# Patient Record
Sex: FEMALE | Race: WHITE | Employment: FULL TIME | URBAN - METROPOLITAN AREA
[De-identification: names, ages, dates, MRNs, and addresses within clinical notes are randomized per-mention and may not be internally consistent; named-entity substitution may affect disease eponyms.]

---

## 2017-01-09 ENCOUNTER — OFFICE VISIT (OUTPATIENT)
Dept: OBGYN CLINIC | Facility: CLINIC | Age: 37
End: 2017-01-09

## 2017-01-09 VITALS
DIASTOLIC BLOOD PRESSURE: 80 MMHG | RESPIRATION RATE: 16 BRPM | HEIGHT: 67 IN | HEART RATE: 76 BPM | BODY MASS INDEX: 26.37 KG/M2 | SYSTOLIC BLOOD PRESSURE: 120 MMHG | WEIGHT: 168 LBS

## 2017-01-09 DIAGNOSIS — Z12.31 SCREENING MAMMOGRAM, ENCOUNTER FOR: ICD-10-CM

## 2017-01-09 DIAGNOSIS — Z31.41 FERTILITY TESTING: ICD-10-CM

## 2017-01-09 DIAGNOSIS — Z12.4 PAPANICOLAOU SMEAR: ICD-10-CM

## 2017-01-09 DIAGNOSIS — Z01.419 WELL WOMAN EXAM WITH ROUTINE GYNECOLOGICAL EXAM: Primary | ICD-10-CM

## 2017-01-09 PROCEDURE — 87624 HPV HI-RISK TYP POOLED RSLT: CPT | Performed by: OBSTETRICS & GYNECOLOGY

## 2017-01-09 PROCEDURE — 99395 PREV VISIT EST AGE 18-39: CPT | Performed by: OBSTETRICS & GYNECOLOGY

## 2017-01-09 PROCEDURE — 87591 N.GONORRHOEAE DNA AMP PROB: CPT | Performed by: OBSTETRICS & GYNECOLOGY

## 2017-01-09 PROCEDURE — 88175 CYTOPATH C/V AUTO FLUID REDO: CPT | Performed by: OBSTETRICS & GYNECOLOGY

## 2017-01-09 PROCEDURE — 87491 CHLMYD TRACH DNA AMP PROBE: CPT | Performed by: OBSTETRICS & GYNECOLOGY

## 2017-01-09 NOTE — PROGRESS NOTES
Patient ID:   Param Miller  is a 31year old female         HPI:     Here for general exam. Last seen 2015. New medical/surgical hx:  None. Patient's last menstrual period was 2016. Menses:    Cash [place] issues to discuss:  CLAY 12/19/2016  Neck:  Supple. Thyroid:  normal.  No cervical adenopathy. Cardiovascular: Normal rate, regular rhythm, normal heart sounds. Pulmonary/Chest: Clear to auscultation.  Breath sounds normal.  Breasts:   Symmetrical.  Firm tissue with homogeneous t

## 2017-01-11 LAB
C TRACH DNA SPEC QL NAA+PROBE: NEGATIVE
N GONORRHOEA DNA SPEC QL NAA+PROBE: NEGATIVE

## 2017-01-12 DIAGNOSIS — N97.9 FEMALE INFERTILITY: Primary | ICD-10-CM

## 2017-01-12 NOTE — PROGRESS NOTES
Quick Note:    STD screening done for RE referral negative.  Please check on status of referral.  ______

## 2017-01-15 LAB — HPV I/H RISK 1 DNA SPEC QL NAA+PROBE: NEGATIVE

## 2017-01-17 NOTE — PROGRESS NOTES
Quick Note:    Left detailed message on cell voicemail. (Pt has FYI)  Pap normal/hpv neg. Pt to call if any questions or concerns.   ______

## 2017-01-25 ENCOUNTER — TELEPHONE (OUTPATIENT)
Dept: FAMILY MEDICINE CLINIC | Facility: CLINIC | Age: 37
End: 2017-01-25

## 2017-01-25 ENCOUNTER — OFFICE VISIT (OUTPATIENT)
Dept: FAMILY MEDICINE CLINIC | Facility: CLINIC | Age: 37
End: 2017-01-25

## 2017-01-25 VITALS
HEART RATE: 70 BPM | BODY MASS INDEX: 25.58 KG/M2 | SYSTOLIC BLOOD PRESSURE: 104 MMHG | TEMPERATURE: 98 F | WEIGHT: 163 LBS | DIASTOLIC BLOOD PRESSURE: 76 MMHG | HEIGHT: 67 IN | RESPIRATION RATE: 16 BRPM

## 2017-01-25 DIAGNOSIS — R63.5 WEIGHT GAIN: ICD-10-CM

## 2017-01-25 DIAGNOSIS — K59.09 CHRONIC CONSTIPATION: Primary | ICD-10-CM

## 2017-01-25 PROCEDURE — 99203 OFFICE O/P NEW LOW 30 MIN: CPT | Performed by: FAMILY MEDICINE

## 2017-01-25 RX ORDER — PHENTERMINE HYDROCHLORIDE 15 MG/1
15 CAPSULE ORAL EVERY MORNING
Qty: 30 CAPSULE | Refills: 0 | Status: SHIPPED | OUTPATIENT
Start: 2017-01-25 | End: 2017-07-27

## 2017-01-25 NOTE — TELEPHONE ENCOUNTER
Let pt know that I want to try linzess 145mcg daily for her chronic constipation. Almost no side effects as it only works in the gut and does not get absorbed. I already sent the script.

## 2017-01-25 NOTE — PROGRESS NOTES
HPI:    Patient ID: Zully Dior is a 31year old female. Constipation  This is a chronic problem. Episode onset: 1 yr  The problem is unchanged. Her stool frequency is 2 to 3 times per week. The stool is described as formed and firm.  The patient is on diagnosis)  Weight gain  Referred to [facility] to get colonoscopy. 1. Chronic constipation  - Gastro Referral - In Network    2. Weight gain  - DIETITIAN EDUCATION INITIAL, DIET (INTERNAL)  - Phentermine HCl 15 MG Oral Cap;  Take 1 capsule (15 mg total) by

## 2017-01-30 ENCOUNTER — TELEPHONE (OUTPATIENT)
Dept: OBGYN CLINIC | Facility: CLINIC | Age: 37
End: 2017-01-30

## 2017-01-30 NOTE — TELEPHONE ENCOUNTER
Pt called stating that Dr. Juan F Noonan office does not have the referral that Dr. Jay Moura gave her on 1/9  Pt would like the following info faxed to Dr. Mariya Boucher office  Referral  Pap smear results  GC/Chlamydia results    Fax:  [phone]

## 2017-02-21 ENCOUNTER — OFFICE VISIT (OUTPATIENT)
Dept: FAMILY MEDICINE CLINIC | Facility: CLINIC | Age: 37
End: 2017-02-21

## 2017-02-21 VITALS
WEIGHT: 162 LBS | OXYGEN SATURATION: 98 % | DIASTOLIC BLOOD PRESSURE: 78 MMHG | TEMPERATURE: 98 F | SYSTOLIC BLOOD PRESSURE: 116 MMHG | HEIGHT: 67 IN | HEART RATE: 61 BPM | BODY MASS INDEX: 25.43 KG/M2 | RESPIRATION RATE: 20 BRPM

## 2017-02-21 DIAGNOSIS — K59.00 CONSTIPATION, UNSPECIFIED CONSTIPATION TYPE: Primary | ICD-10-CM

## 2017-02-21 DIAGNOSIS — E66.3 OVERWEIGHT: ICD-10-CM

## 2017-02-21 PROCEDURE — 99213 OFFICE O/P EST LOW 20 MIN: CPT | Performed by: FAMILY MEDICINE

## 2017-02-21 RX ORDER — PHENTERMINE HYDROCHLORIDE 37.5 MG/1
37.5 TABLET ORAL
Qty: 30 TABLET | Refills: 1 | Status: SHIPPED | OUTPATIENT
Start: 2017-02-21 | End: 2017-05-21

## 2017-02-22 NOTE — PROGRESS NOTES
HPI:    Patient ID: Toby Dawson is a 31year old female. Constipation  This is a chronic problem. Episode onset: few yrs ago. The problem has been gradually worsening since onset. The stool is described as firm. The patient is on a high fiber diet.  She She is not diaphoretic. Vitals reviewed. ASSESSMENT/PLAN:   Constipation, unspecified constipation type  (primary encounter diagnosis)  Overweight  1. Constipation, unspecified constipation type  Continue with GI follow up.   Try higher dose o

## 2017-03-22 ENCOUNTER — HOSPITAL ENCOUNTER (OUTPATIENT)
Dept: MAMMOGRAPHY | Age: 37
Discharge: HOME | End: 2017-03-22
Attending: OBSTETRICS & GYNECOLOGY
Payer: COMMERCIAL

## 2017-03-22 DIAGNOSIS — Z12.31 SCREENING MAMMOGRAM, ENCOUNTER FOR: ICD-10-CM

## 2017-03-22 PROCEDURE — 77067 SCR MAMMO BI INCL CAD: CPT

## 2017-03-29 ENCOUNTER — HOSPITAL ENCOUNTER (OUTPATIENT)
Dept: GENERAL RADIOLOGY | Facility: HOSPITAL | Age: 37
Discharge: HOME | End: 2017-03-29
Attending: INTERNAL MEDICINE
Payer: COMMERCIAL

## 2017-03-29 DIAGNOSIS — K59.02 OUTLET DYSFUNCTION CONSTIPATION: ICD-10-CM

## 2017-03-29 DIAGNOSIS — K59.00 CONSTIPATION, UNSPECIFIED CONSTIPATION TYPE: ICD-10-CM

## 2017-03-29 PROCEDURE — 74000: CPT

## 2017-03-30 ENCOUNTER — TELEPHONE (OUTPATIENT)
Dept: OBGYN CLINIC | Facility: CLINIC | Age: 37
End: 2017-03-30

## 2017-03-30 NOTE — TELEPHONE ENCOUNTER
Patient states she had tubal ligation in 2008. She was referred to Dr. Win Luevano. She states her insurance will not cover any testing b/c of tubal.  She is wondering if there is anyway the tubal can be reversed.    She states her insurance will cover it if s

## 2017-03-30 NOTE — TELEPHONE ENCOUNTER
Tubal reversal can be attempted, but success depends on how much tube remains. Would need to get copy of operative report and then see reproductive specialist who does tubal reversals.

## 2017-03-31 ENCOUNTER — HOSPITAL ENCOUNTER (OUTPATIENT)
Dept: GENERAL RADIOLOGY | Facility: HOSPITAL | Age: 37
Discharge: HOME | End: 2017-03-31
Attending: INTERNAL MEDICINE
Payer: COMMERCIAL

## 2017-03-31 DIAGNOSIS — K59.02 OUTLET DYSFUNCTION CONSTIPATION: ICD-10-CM

## 2017-03-31 DIAGNOSIS — K59.00 CONSTIPATION: ICD-10-CM

## 2017-03-31 PROCEDURE — 74000: CPT

## 2017-03-31 NOTE — TELEPHONE ENCOUNTER
OR report was obtained from Medical Records. Do you recommend a specialist who does tubal reversals? Please advise.

## 2017-04-01 NOTE — TELEPHONE ENCOUNTER
Please send operative report to pt. Do not know who in area does tubal reversals. Have her start by calling Dr Annette Morales and Dr Andre Alvarez. May need to get referral list of reproductive endocrinologists from her insurance company.

## 2017-04-03 NOTE — TELEPHONE ENCOUNTER
Patient informed. Verbalized understanding. Copy mailed to patient's home of Op report. No further questions or concerns @ this time.

## 2017-04-18 ENCOUNTER — MED REC SCAN ONLY (OUTPATIENT)
Dept: FAMILY MEDICINE CLINIC | Facility: CLINIC | Age: 37
End: 2017-04-18

## 2017-04-25 ENCOUNTER — TELEPHONE (OUTPATIENT)
Dept: OBGYN CLINIC | Facility: CLINIC | Age: 37
End: 2017-04-25

## 2017-04-25 NOTE — TELEPHONE ENCOUNTER
Patient called, she had her tubal done and she wants it reserve. Dr. Jennifer Luna told her that he does not know anyone who does it. So, she did it herself and look for someone.   She found one , Dr. Claudell Louis, who can do it but he wants a copy of the patho

## 2017-04-26 NOTE — TELEPHONE ENCOUNTER
Left message to call back. Op report received, medical records unable to locate path report. Informed patient to call back after 9 AM tomorrow.

## 2017-04-26 NOTE — TELEPHONE ENCOUNTER
Spoke to medical records. Will obtain records and fax to Katelyn Coker [id number].

## 2017-04-26 NOTE — TELEPHONE ENCOUNTER
Patient returned phone call  She knows Constantino Del Toro will call her back with more information

## 2017-05-09 ENCOUNTER — TELEPHONE (OUTPATIENT)
Dept: FAMILY MEDICINE CLINIC | Facility: CLINIC | Age: 37
End: 2017-05-09

## 2017-05-22 RX ORDER — PHENTERMINE HYDROCHLORIDE 37.5 MG/1
TABLET ORAL
Qty: 30 TABLET | Refills: 0 | Status: SHIPPED | OUTPATIENT
Start: 2017-05-22 | End: 2017-06-30

## 2017-06-30 ENCOUNTER — TELEPHONE (OUTPATIENT)
Dept: FAMILY MEDICINE CLINIC | Facility: CLINIC | Age: 37
End: 2017-06-30

## 2017-07-05 RX ORDER — PHENTERMINE HYDROCHLORIDE 37.5 MG/1
TABLET ORAL
Qty: 30 TABLET | Refills: 0 | Status: SHIPPED | OUTPATIENT
Start: 2017-07-05 | End: 2017-08-07

## 2017-07-05 NOTE — TELEPHONE ENCOUNTER
Pharmacy called stated they did not get the refill/approval for the phentermine medication. Please call and advise.

## 2017-07-20 ENCOUNTER — OFFICE VISIT (OUTPATIENT)
Dept: OBGYN CLINIC | Facility: CLINIC | Age: 37
End: 2017-07-20

## 2017-07-20 DIAGNOSIS — N92.6 IRREGULAR MENSES: Primary | ICD-10-CM

## 2017-07-20 DIAGNOSIS — N97.1 SECONDARY TUBAL INFERTILITY: ICD-10-CM

## 2017-07-20 PROCEDURE — 99212 OFFICE O/P EST SF 10 MIN: CPT | Performed by: OBSTETRICS & GYNECOLOGY

## 2017-07-20 RX ORDER — LEVONORGESTREL AND ETHINYL ESTRADIOL 0.1-0.02MG
1 KIT ORAL DAILY
Qty: 3 PACKAGE | Refills: 1 | Status: SHIPPED | OUTPATIENT
Start: 2017-07-20 | End: 2017-09-19

## 2017-07-20 NOTE — PROGRESS NOTES
Patient is a 30year old here for consultation on several matters. Last seen in January 2017. Wants another pregnancy and has had tubal ligation. Met with Dr Debby Navas for consultation on IVF/ET.  Insurance will not cover since she had an elective tubal ligati

## 2017-07-27 ENCOUNTER — APPOINTMENT (OUTPATIENT)
Dept: GENERAL RADIOLOGY | Age: 37
End: 2017-07-27
Attending: PHYSICIAN ASSISTANT
Payer: COMMERCIAL

## 2017-07-27 ENCOUNTER — HOSPITAL ENCOUNTER (OUTPATIENT)
Age: 37
Discharge: HOME | End: 2017-07-27
Payer: COMMERCIAL

## 2017-07-27 VITALS
HEART RATE: 88 BPM | OXYGEN SATURATION: 99 % | RESPIRATION RATE: 18 BRPM | SYSTOLIC BLOOD PRESSURE: 132 MMHG | TEMPERATURE: 98 F | DIASTOLIC BLOOD PRESSURE: 92 MMHG

## 2017-07-27 DIAGNOSIS — M70.41 PREPATELLAR BURSITIS OF RIGHT KNEE: Primary | ICD-10-CM

## 2017-07-27 PROCEDURE — 99213 OFFICE O/P EST LOW 20 MIN: CPT

## 2017-07-27 PROCEDURE — 99214 OFFICE O/P EST MOD 30 MIN: CPT

## 2017-07-27 PROCEDURE — 73560 X-RAY EXAM OF KNEE 1 OR 2: CPT | Performed by: PHYSICIAN ASSISTANT

## 2017-07-27 RX ORDER — METHYLPREDNISOLONE 4 MG/1
TABLET ORAL
Qty: 1 PACKAGE | Refills: 0 | Status: SHIPPED | OUTPATIENT
Start: 2017-07-27 | End: 2017-09-19

## 2017-07-27 NOTE — ED PROVIDER NOTES
Patient Seen in: [facility]    History   Patient presents with:  Knee Pain    Stated Complaint: r knee swelling 1 day    HPI    31yo F who comes in with right knee swelling and pain that began today.  She states that this began t Packs/day: 0.00      Years: 10.00        Types: Cigarettes     Quit date: 3/1/2016  Smokeless tobacco: Never Used                      Comment: 1 PK./WK. SINCE ABT 1999.   Alcohol use: Yes           0.6 oz/week     Standard drinks or equivalent: 1 per week exhibits normal muscle tone. Coordination normal.   Skin: Skin is warm and dry. No rash noted. She is not diaphoretic. No erythema. No pallor. Psychiatric: She has a normal mood and affect.  Her behavior is normal. Judgment and thought content normal. Pack  Take as directed on package  Qty: 1 Package Refills: 0  Associated Diagnoses:Prepatellar bursitis of right knee      I have given the patient instructions regarding her diagnosis, expectations, follow up, and return to the ER precautions.   I explaine

## 2017-08-07 RX ORDER — PHENTERMINE HYDROCHLORIDE 37.5 MG/1
TABLET ORAL
Qty: 30 TABLET | Refills: 0 | Status: SHIPPED | OUTPATIENT
Start: 2017-08-07 | End: 2017-09-18

## 2017-08-12 ENCOUNTER — HOSPITAL ENCOUNTER (EMERGENCY)
Facility: HOSPITAL | Age: 37
Discharge: HOME | End: 2017-08-12
Attending: EMERGENCY MEDICINE
Payer: COMMERCIAL

## 2017-08-12 VITALS
SYSTOLIC BLOOD PRESSURE: 132 MMHG | TEMPERATURE: 98 F | OXYGEN SATURATION: 100 % | HEART RATE: 88 BPM | BODY MASS INDEX: 22.76 KG/M2 | HEIGHT: 67 IN | WEIGHT: 145 LBS | DIASTOLIC BLOOD PRESSURE: 89 MMHG | RESPIRATION RATE: 19 BRPM

## 2017-08-12 DIAGNOSIS — K64.5 EXTERNAL THROMBOSED HEMORRHOIDS: Primary | ICD-10-CM

## 2017-08-12 PROCEDURE — 99283 EMERGENCY DEPT VISIT LOW MDM: CPT

## 2017-08-12 PROCEDURE — 46083 INC THROMBOSED HROID XTRNL: CPT

## 2017-08-12 RX ORDER — DIBUCAINE 0.28 G/28G
1 OINTMENT TOPICAL 3 TIMES DAILY
Qty: 1 TUBE | Refills: 0 | Status: SHIPPED | OUTPATIENT
Start: 2017-08-12 | End: 2017-09-19

## 2017-08-12 RX ORDER — DOCUSATE SODIUM 100 MG/1
100 CAPSULE, LIQUID FILLED ORAL 2 TIMES DAILY
Qty: 60 CAPSULE | Refills: 0 | Status: SHIPPED | OUTPATIENT
Start: 2017-08-12 | End: 2017-09-11

## 2017-08-12 RX ORDER — TRAMADOL HYDROCHLORIDE 50 MG/1
TABLET ORAL EVERY 4 HOURS PRN
Qty: 20 TABLET | Refills: 0 | Status: SHIPPED | OUTPATIENT
Start: 2017-08-12 | End: 2017-08-19

## 2017-08-12 NOTE — ED NOTES
Small serosanguinous drainage on gauze, no excessive bleeding at DC. Gauze dressing changed, given maxi pad to cushion the area.

## 2017-08-12 NOTE — ED PROVIDER NOTES
Patient Seen in: [facility] Emergency Department    History   Patient presents with:  Anal Problem (GI)    Stated Complaint: hemmroid    HPI    This is a pleasant 61-year-old female coming with complaints of the hemorrhoid.   She is actually had this Maternal Grandmother    • Hypertension Mother    • Other[other] [OTHER] Maternal Grandfather    • Stroke Maternal Grandfather    • Heart Disease Maternal Grandfather    • Stroke Paternal Grandfather    • Heart Disease Paternal Grandfather    • Other[other] 1% lidocaine solution was instilled within the thrombosed hemorrhoid itself and using 11 blade stab incision was made. Some blood and blood clot material was removed.   Patient has dressing applied patient was referred to general surgery for follow-up and

## 2017-08-12 NOTE — ED INITIAL ASSESSMENT (HPI)
C/o rectal pain, \"I have hemorrhoids, I've been dealing with this for 2 days and it's getting worse. I've been having problems with my bowels, I don't go if I don't take anything for it. \" Last BM this am PTA.

## 2017-08-17 ENCOUNTER — TELEPHONE (OUTPATIENT)
Dept: FAMILY MEDICINE CLINIC | Facility: CLINIC | Age: 37
End: 2017-08-17

## 2017-08-17 DIAGNOSIS — K64.5 THROMBOSED HEMORRHOIDS: Primary | ICD-10-CM

## 2017-09-12 RX ORDER — PHENTERMINE HYDROCHLORIDE 37.5 MG/1
TABLET ORAL
Qty: 30 TABLET | Refills: 0 | OUTPATIENT
Start: 2017-09-12

## 2017-09-18 RX ORDER — PHENTERMINE HYDROCHLORIDE 37.5 MG/1
TABLET ORAL
Qty: 30 TABLET | Refills: 0 | Status: SHIPPED | OUTPATIENT
Start: 2017-09-18 | End: 2018-01-10

## 2017-09-19 ENCOUNTER — OFFICE VISIT (OUTPATIENT)
Dept: FAMILY MEDICINE CLINIC | Facility: CLINIC | Age: 37
End: 2017-09-19

## 2017-09-19 VITALS
RESPIRATION RATE: 16 BRPM | WEIGHT: 146 LBS | SYSTOLIC BLOOD PRESSURE: 132 MMHG | BODY MASS INDEX: 22.91 KG/M2 | HEART RATE: 89 BPM | DIASTOLIC BLOOD PRESSURE: 74 MMHG | HEIGHT: 67 IN | OXYGEN SATURATION: 98 % | TEMPERATURE: 98 F

## 2017-09-19 DIAGNOSIS — K59.09 CHRONIC CONSTIPATION: ICD-10-CM

## 2017-09-19 DIAGNOSIS — G89.29 CHRONIC PAIN OF RIGHT KNEE: ICD-10-CM

## 2017-09-19 DIAGNOSIS — K64.4 EXTERNAL HEMORRHOID: ICD-10-CM

## 2017-09-19 DIAGNOSIS — Z79.899 MEDICATION MANAGEMENT: Primary | ICD-10-CM

## 2017-09-19 DIAGNOSIS — M25.561 CHRONIC PAIN OF RIGHT KNEE: ICD-10-CM

## 2017-09-19 PROCEDURE — 99214 OFFICE O/P EST MOD 30 MIN: CPT | Performed by: FAMILY MEDICINE

## 2017-09-19 RX ORDER — POLYETHYLENE GLYCOL 3350 17 G/17G
17 POWDER, FOR SOLUTION ORAL DAILY
Qty: 100 EACH | Refills: 1 | Status: SHIPPED | OUTPATIENT
Start: 2017-09-19 | End: 2018-01-10

## 2017-09-19 RX ORDER — PHENTERMINE HYDROCHLORIDE 15 MG/1
15 CAPSULE ORAL EVERY MORNING
Qty: 14 CAPSULE | Refills: 0 | Status: SHIPPED | OUTPATIENT
Start: 2017-10-09 | End: 2018-01-10

## 2017-09-21 NOTE — PROGRESS NOTES
HPI:    Patient ID: Swathi Huang is a 30year old female. Chronic constipation. States that the senna tea she is using is helping. But not completely. Had tried miralax for a couple of months a few years ago without relief.   Has not gone back to see Comment:sneezing  Hydrocodone-Acetami*    Rash   PHYSICAL EXAM:   Physical Exam   Constitutional: She appears well-developed and well-nourished. No distress. Eyes: Conjunctivae are normal. Right eye exhibits no discharge. Left eye exhibits no discharge. this encounter. Meds This Visit:  Signed Prescriptions Disp Refills    Phentermine HCl 15 MG Oral Cap 14 capsule 0      Sig: Take 1 capsule (15 mg total) by mouth every morning.       PEG 3350 Oral Powd Pack 100 each 1      Sig: Take 17 g by mouth joseline

## 2017-09-22 NOTE — MR AVS SNAPSHOT
After Visit Summary   12/20/2019    Farida Nguyen    MRN: [id number]           Visit Information     Date & Time  12/20/2019 10:00 AM Provider  Leann Prater MD [facility] [address] [address] [facility] Dept.  Phone  [phone] Water is best for hydration Fast food. Eat at home when possible     Tips for increasing your physical activity – Adults who are physically active are less likely to develop some chronic diseases than adults who are inactive.      HOW TO GET STARTED: HOW Communicate with a [facility] physician or [facility]  online. The physician will respond and provide a treatment plan within a few hours.            Treatment for mild   illness or injury that does   not require immediate attention   VIDEO VISITS  Average cost  $35* Bronchiectasis

## 2017-11-02 ENCOUNTER — TELEPHONE (OUTPATIENT)
Dept: OBGYN CLINIC | Facility: CLINIC | Age: 37
End: 2017-11-02

## 2017-11-02 DIAGNOSIS — Z20.821 ZIKA VIRUS EXPOSURE: Primary | ICD-10-CM

## 2017-11-02 NOTE — TELEPHONE ENCOUNTER
Patient called, her and her  returned last Friday, Oct 29th from [facility]. They attended a wedding and stayed there for 5 days. Either one of them get sick during there stay.   Before they left, wife spoke to Dr. Janie Pulliam and he told her that when they

## 2017-11-04 NOTE — TELEPHONE ENCOUNTER
Needs to have paper work generated for [facility] screening - IVF planned for conception due to TL. Spouse needs to contact his PCP for same.

## 2017-11-06 NOTE — TELEPHONE ENCOUNTER
Pt reports travel to [facility] from 10/25 - 10/29. Pt reports itchy, red rash to right leg since returning; continues now. Pt also reports multiple mosquito bites during trip to both her and spouse.   Pt advised we will contact Shanta Saucedo

## 2017-11-06 NOTE — TELEPHONE ENCOUNTER
IDPH form completed and faxed back to Maureen Avelar at [facility].

## 2017-11-06 NOTE — TELEPHONE ENCOUNTER
Left message on answering machine to call back. Need exact dates of travel, location and if patient has experienced any fever, rash, headache, joint pain, conjunctivitis or muscle weakness since returning.

## 2017-11-07 NOTE — TELEPHONE ENCOUNTER
[facility] calling and will not authorize testing (Free)    Pt does not qualify per CDC guidelines  Not pregnant  Just looking for treatment for Fertility Treatment  Pt should obstain from intercourse for 6 months and/or wait 6 months for IVF

## 2017-11-07 NOTE — TELEPHONE ENCOUNTER
Patient informed. Verbalized understanding. Order entered. She will check with insurance on coverage. No further questions or concerns.

## 2017-11-11 ENCOUNTER — TELEPHONE (OUTPATIENT)
Dept: FAMILY MEDICINE CLINIC | Facility: CLINIC | Age: 37
End: 2017-11-11

## 2017-11-15 ENCOUNTER — TELEPHONE (OUTPATIENT)
Dept: OBGYN CLINIC | Facility: CLINIC | Age: 37
End: 2017-11-15

## 2017-11-15 ENCOUNTER — APPOINTMENT (OUTPATIENT)
Dept: LAB | Age: 37
End: 2017-11-15
Attending: OBSTETRICS & GYNECOLOGY
Payer: COMMERCIAL

## 2017-11-15 DIAGNOSIS — Z20.821 ZIKA VIRUS EXPOSURE: ICD-10-CM

## 2017-11-15 PROCEDURE — 36415 COLL VENOUS BLD VENIPUNCTURE: CPT

## 2017-11-15 NOTE — TELEPHONE ENCOUNTER
Patient states that she was referred to Dr Huang Cain By Dr No Ochoa. Dr Huang Cain wants some tests ordered and told the patient that Dr No Ochoa can order some of the tests. She wants to know what the next step would be.

## 2017-11-15 NOTE — TELEPHONE ENCOUNTER
Returned patient's call. Let her know that Dee Chambers will need to know which tests Dr. Aric Rudolph is asking him to order. Patient states understanding and will contact Dr. CRISTINA [facility] office.

## 2017-11-16 ENCOUNTER — TELEPHONE (OUTPATIENT)
Dept: OBGYN CLINIC | Facility: CLINIC | Age: 37
End: 2017-11-16

## 2017-11-16 NOTE — TELEPHONE ENCOUNTER
Spoke with Curtis Estevez at the lab. They are not able to run the test (FDA rules)  Unless patient is pregnant OR if she had exposure AND is experiencing symptoms. Left message for patient to call back.

## 2017-11-16 NOTE — TELEPHONE ENCOUNTER
[place] lab called stating that the patient can not be tested for the Zika Virus due to the fact that  she is not pregnant

## 2017-11-16 NOTE — TELEPHONE ENCOUNTER
Patient informed. Verbalized understanding. She did not qualify with health department. Stated her  has had \"flu\" like symptoms for a while. She is unsure if it was because of weather or exposure.   They did get bitten by mosquitoes but patient

## 2018-01-10 ENCOUNTER — LAB ENCOUNTER (OUTPATIENT)
Dept: LAB | Age: 38
End: 2018-01-10
Attending: FAMILY MEDICINE
Payer: COMMERCIAL

## 2018-01-10 ENCOUNTER — OFFICE VISIT (OUTPATIENT)
Dept: FAMILY MEDICINE CLINIC | Facility: CLINIC | Age: 38
End: 2018-01-10

## 2018-01-10 VITALS
RESPIRATION RATE: 20 BRPM | SYSTOLIC BLOOD PRESSURE: 120 MMHG | HEIGHT: 67 IN | TEMPERATURE: 98 F | BODY MASS INDEX: 24.01 KG/M2 | OXYGEN SATURATION: 98 % | HEART RATE: 67 BPM | DIASTOLIC BLOOD PRESSURE: 84 MMHG | WEIGHT: 153 LBS

## 2018-01-10 DIAGNOSIS — Z00.00 ANNUAL PHYSICAL EXAM: Primary | ICD-10-CM

## 2018-01-10 DIAGNOSIS — Z00.00 ANNUAL PHYSICAL EXAM: ICD-10-CM

## 2018-01-10 DIAGNOSIS — E66.3 OVERWEIGHT: ICD-10-CM

## 2018-01-10 DIAGNOSIS — H01.139 ECZEMA OF EYELID, UNSPECIFIED LATERALITY: ICD-10-CM

## 2018-01-10 LAB
25-HYDROXYVITAMIN D (TOTAL): 19.1 NG/ML (ref 30–100)
ALBUMIN SERPL-MCNC: 3.8 G/DL (ref 3.5–4.8)
ALP LIVER SERPL-CCNC: 51 U/L (ref 37–98)
ALT SERPL-CCNC: 12 U/L (ref 14–54)
AST SERPL-CCNC: 12 U/L (ref 15–41)
BASOPHILS # BLD AUTO: 0.05 X10(3) UL (ref 0–0.1)
BASOPHILS NFR BLD AUTO: 0.9 %
BILIRUB SERPL-MCNC: 0.9 MG/DL (ref 0.1–2)
BUN BLD-MCNC: 8 MG/DL (ref 8–20)
CALCIUM BLD-MCNC: 8.6 MG/DL (ref 8.3–10.3)
CHLORIDE: 108 MMOL/L (ref 101–111)
CHOLEST SMN-MCNC: 140 MG/DL (ref ?–200)
CO2: 26 MMOL/L (ref 22–32)
CREAT BLD-MCNC: 0.7 MG/DL (ref 0.55–1.02)
DEPRECATED HBV CORE AB SER IA-ACNC: 5.2 NG/ML (ref 10–291)
EOSINOPHIL # BLD AUTO: 0.32 X10(3) UL (ref 0–0.3)
EOSINOPHIL NFR BLD AUTO: 5.9 %
ERYTHROCYTE [DISTWIDTH] IN BLOOD BY AUTOMATED COUNT: 13 % (ref 11.5–16)
FOLATE (FOLIC ACID), SERUM: 19.4 NG/ML (ref 8.7–24)
GLUCOSE BLD-MCNC: 92 MG/DL (ref 70–99)
HAV AB SERPL IA-ACNC: 321 PG/ML (ref 193–986)
HCT VFR BLD AUTO: 37.2 % (ref 34–50)
HDLC SERPL-MCNC: 61 MG/DL (ref 45–?)
HDLC SERPL: 2.3 {RATIO} (ref ?–4.44)
HGB BLD-MCNC: 12.5 G/DL (ref 12–16)
IMMATURE GRANULOCYTE COUNT: 0.01 X10(3) UL (ref 0–1)
IMMATURE GRANULOCYTE RATIO %: 0.2 %
LDLC SERPL CALC-MCNC: 66 MG/DL (ref ?–130)
LYMPHOCYTES # BLD AUTO: 1.3 X10(3) UL (ref 0.9–4)
LYMPHOCYTES NFR BLD AUTO: 24 %
M PROTEIN MFR SERPL ELPH: 6.9 G/DL (ref 6.1–8.3)
MCH RBC QN AUTO: 29.6 PG (ref 27–33.2)
MCHC RBC AUTO-ENTMCNC: 33.6 G/DL (ref 31–37)
MCV RBC AUTO: 88.2 FL (ref 81–100)
MONOCYTES # BLD AUTO: 0.66 X10(3) UL (ref 0.1–0.6)
MONOCYTES NFR BLD AUTO: 12.2 %
NEUTROPHIL ABS PRELIM: 3.07 X10 (3) UL (ref 1.3–6.7)
NEUTROPHILS # BLD AUTO: 3.07 X10(3) UL (ref 1.3–6.7)
NEUTROPHILS NFR BLD AUTO: 56.8 %
NONHDLC SERPL-MCNC: 79 MG/DL (ref ?–130)
PLATELET # BLD AUTO: 259 10(3)UL (ref 150–450)
POTASSIUM SERPL-SCNC: 4.3 MMOL/L (ref 3.6–5.1)
RBC # BLD AUTO: 4.22 X10(6)UL (ref 3.8–5.1)
RED CELL DISTRIBUTION WIDTH-SD: 42.3 FL (ref 35.1–46.3)
SODIUM SERPL-SCNC: 139 MMOL/L (ref 136–144)
TRIGL SERPL-MCNC: 65 MG/DL (ref ?–150)
TSI SER-ACNC: 1 MIU/ML (ref 0.35–5.5)
VLDLC SERPL CALC-MCNC: 13 MG/DL (ref 5–40)
WBC # BLD AUTO: 5.4 X10(3) UL (ref 4–13)

## 2018-01-10 PROCEDURE — 82607 VITAMIN B-12: CPT

## 2018-01-10 PROCEDURE — 84443 ASSAY THYROID STIM HORMONE: CPT

## 2018-01-10 PROCEDURE — 82306 VITAMIN D 25 HYDROXY: CPT

## 2018-01-10 PROCEDURE — 85025 COMPLETE CBC W/AUTO DIFF WBC: CPT

## 2018-01-10 PROCEDURE — 82746 ASSAY OF FOLIC ACID SERUM: CPT

## 2018-01-10 PROCEDURE — 99395 PREV VISIT EST AGE 18-39: CPT | Performed by: FAMILY MEDICINE

## 2018-01-10 PROCEDURE — 36415 COLL VENOUS BLD VENIPUNCTURE: CPT

## 2018-01-10 PROCEDURE — 80053 COMPREHEN METABOLIC PANEL: CPT

## 2018-01-10 PROCEDURE — 82728 ASSAY OF FERRITIN: CPT

## 2018-01-10 PROCEDURE — 80061 LIPID PANEL: CPT

## 2018-01-10 RX ORDER — CHLORAL HYDRATE 500 MG
1000 CAPSULE ORAL DAILY
COMMUNITY
End: 2018-05-01

## 2018-01-10 RX ORDER — PRENATAL VIT 91/IRON/FOLIC/DHA 28-975-200
1 COMBINATION PACKAGE (EA) ORAL DAILY
Qty: 90 EACH | Refills: 3 | Status: SHIPPED | OUTPATIENT
Start: 2018-01-10 | End: 2018-10-19 | Stop reason: ALTCHOICE

## 2018-01-10 NOTE — H&P
HPI:   Andrea Pineda is a 30year old female who presents for a well woman exam. Symptoms: denies discharge, itching, burning or dysuria. Eczema   This is a chronic problem. The current episode started more than 1 year ago. The problem occurs daily.  The • Heart Disease Maternal Grandfather    • Stroke Paternal Grandfather    • Heart Disease Paternal Grandfather    • Other[other] [OTHER] Sister      constipation   • Breast Cancer Maternal Aunt 48      Social History:   Smoking status: Former Smoker given  MS: Cecy, no bony deformities  EXT: no cyanosis, clubbing or edema  NEURO: A&Ox3, motor and sensory are grossly intact, LEs +2 DTRs. ASSESSMENT AND PLAN:   1. Annual physical exam  - CBC WITH DIFFERENTIAL WITH PLATELET;  Future  - COMP METABOLIC P

## 2018-01-10 NOTE — TELEPHONE ENCOUNTER
Prenatal MV-Min-Fe Fum-FA-DHA (PRENATAL+DHA) 28-0.975 & 200 MG Oral   Is NOT covered by insurance - Requesting change or PA. Please advise.

## 2018-01-18 ENCOUNTER — TELEPHONE (OUTPATIENT)
Dept: FAMILY MEDICINE CLINIC | Facility: CLINIC | Age: 38
End: 2018-01-18

## 2018-01-18 NOTE — TELEPHONE ENCOUNTER
PA required for [employer] complete prenatal + DHA 28-.975 & 200mg  Tabs. Diagnosis? Is pt trying to get pregnant or is pregnant? Can she use any prenatal vitamin or is there a reason she needs this vitamin.  This was prescribed at 1/10/18 office visit, how

## 2018-02-08 ENCOUNTER — TELEPHONE (OUTPATIENT)
Dept: OBGYN CLINIC | Facility: CLINIC | Age: 38
End: 2018-02-08

## 2018-02-08 NOTE — TELEPHONE ENCOUNTER
Received diagnostic work up list from Dr. Shashank Robison office in [place] for Dr. Rebel Rivas. Faxed a copy to [place] since PT will see Dr. Rebel Rivas there on 02/12/18. Copy in [place]; just as an FYI.

## 2018-02-08 NOTE — TELEPHONE ENCOUNTER
Patient called regarding testing. States that there is an in office 3D HSN that needs to be done between day 6 and 12 of cycle. Patient desires to set this up as she is day 4 of cycle.      Spoke with Rogers Taveras who faxed a copy to [facility] for review

## 2018-02-09 ENCOUNTER — TELEPHONE (OUTPATIENT)
Dept: OBGYN CLINIC | Facility: CLINIC | Age: 38
End: 2018-02-09

## 2018-02-09 NOTE — TELEPHONE ENCOUNTER
Seeing Dr Miriam Bianchi for IVF. Ultrasound suggested endometrial polyps. LMP February 5th. Insurance with cover sonohysterogram if done in our office. Has appt for exam on February 12th. Will see if procedure can be scheduled.

## 2018-02-12 ENCOUNTER — MED REC SCAN ONLY (OUTPATIENT)
Dept: OBGYN CLINIC | Facility: CLINIC | Age: 38
End: 2018-02-12

## 2018-02-12 ENCOUNTER — OFFICE VISIT (OUTPATIENT)
Dept: OBGYN CLINIC | Facility: CLINIC | Age: 38
End: 2018-02-12

## 2018-02-12 ENCOUNTER — TELEPHONE (OUTPATIENT)
Dept: OBGYN CLINIC | Facility: CLINIC | Age: 38
End: 2018-02-12

## 2018-02-12 ENCOUNTER — APPOINTMENT (OUTPATIENT)
Dept: OBGYN CLINIC | Facility: CLINIC | Age: 38
End: 2018-02-12

## 2018-02-12 VITALS
HEIGHT: 66.5 IN | WEIGHT: 153 LBS | SYSTOLIC BLOOD PRESSURE: 122 MMHG | HEART RATE: 64 BPM | DIASTOLIC BLOOD PRESSURE: 68 MMHG | BODY MASS INDEX: 24.3 KG/M2

## 2018-02-12 DIAGNOSIS — Z12.4 ROUTINE PAPANICOLAOU SMEAR: ICD-10-CM

## 2018-02-12 DIAGNOSIS — N85.9 DISORDER OF ENDOMETRIUM: Primary | ICD-10-CM

## 2018-02-12 DIAGNOSIS — Z01.419 ENCOUNTER FOR GYNECOLOGICAL EXAMINATION WITHOUT ABNORMAL FINDING: Primary | ICD-10-CM

## 2018-02-12 DIAGNOSIS — N97.9 INFERTILITY, FEMALE: ICD-10-CM

## 2018-02-12 DIAGNOSIS — Z11.3 SCREEN FOR STD (SEXUALLY TRANSMITTED DISEASE): ICD-10-CM

## 2018-02-12 PROCEDURE — 76831 ECHO EXAM UTERUS: CPT | Performed by: OBSTETRICS & GYNECOLOGY

## 2018-02-12 PROCEDURE — 88175 CYTOPATH C/V AUTO FLUID REDO: CPT | Performed by: OBSTETRICS & GYNECOLOGY

## 2018-02-12 PROCEDURE — 58340 CATHETER FOR HYSTEROGRAPHY: CPT | Performed by: OBSTETRICS & GYNECOLOGY

## 2018-02-12 PROCEDURE — 87591 N.GONORRHOEAE DNA AMP PROB: CPT | Performed by: OBSTETRICS & GYNECOLOGY

## 2018-02-12 PROCEDURE — 87491 CHLMYD TRACH DNA AMP PROBE: CPT | Performed by: OBSTETRICS & GYNECOLOGY

## 2018-02-12 PROCEDURE — 99395 PREV VISIT EST AGE 18-39: CPT | Performed by: OBSTETRICS & GYNECOLOGY

## 2018-02-12 RX ORDER — PHENTERMINE HYDROCHLORIDE 15 MG/1
CAPSULE ORAL
Refills: 0 | COMMUNITY
Start: 2017-11-17 | End: 2018-02-12

## 2018-02-12 NOTE — TELEPHONE ENCOUNTER
PT filled out and signed medical release form to be able to send recent test results and tubaligation records to Dr. Yeimy Luu office.

## 2018-02-12 NOTE — PROGRESS NOTES
Patient ID:   Ashely Yeboah  is a 30year old female         HPI:     Here for general gyn exam and initiate work up needed for IVF. Last seen for consultation in 2017. Last annual exam in 2017. New medical/surgical hx:  None.       P Surgeon:                Branden Marquez MD;  Location: [facility]  No date:       Comment: two, denies complix. No date: OTHER SURGICAL HISTORY      Comment: SPONT.  AB  : TUBAL LIGATION    Current Outpatient Prescriptions on File Prior to Visi or ordered in this encounter       Imaging & Consults:  None    NOTES:  Will go to lab on day 3 of menses to get labs as ordered / needed to implement IVF cycle. See as needed or with conception.

## 2018-02-12 NOTE — PROGRESS NOTES
Here for gynecological ultrasound due to possible endometrial polyp or other abnormality of endometrium. Seeing Dr Werner Ryan in preparation for IVF cycle. Ultrasound done for antral follicular count with fluid within cavity and suspicion of endometrial polyp.

## 2018-02-13 DIAGNOSIS — N97.9 FEMALE INFERTILITY: Primary | ICD-10-CM

## 2018-02-13 LAB
C TRACH DNA SPEC QL NAA+PROBE: NEGATIVE
N GONORRHOEA DNA SPEC QL NAA+PROBE: NEGATIVE

## 2018-02-13 NOTE — TELEPHONE ENCOUNTER
PT needs tubaligation records from 2008. Faxed Recall request for PT's chart. Will hold release in [place] until we obtain PT's chart.

## 2018-02-22 ENCOUNTER — TELEPHONE (OUTPATIENT)
Dept: OBGYN CLINIC | Facility: CLINIC | Age: 38
End: 2018-02-22

## 2018-02-22 NOTE — TELEPHONE ENCOUNTER
Patient had additional questions regarding HPV/Colposcopy. All questions answered. Patient happy with answers. Patient to call back with any additional questions or concerns.

## 2018-02-22 NOTE — TELEPHONE ENCOUNTER
PT calling and has more questions on her Colpo scheduled    She just changed appt due to being on cycle

## 2018-02-22 NOTE — TELEPHONE ENCOUNTER
Unable to get through on patient's work #. Left message for patient on cell to call the office back.

## 2018-02-26 ENCOUNTER — TELEPHONE (OUTPATIENT)
Dept: OBGYN CLINIC | Facility: CLINIC | Age: 38
End: 2018-02-26

## 2018-02-26 DIAGNOSIS — Z12.39 BREAST CANCER SCREENING: Primary | ICD-10-CM

## 2018-03-15 ENCOUNTER — OFFICE VISIT (OUTPATIENT)
Dept: OBGYN CLINIC | Facility: CLINIC | Age: 38
End: 2018-03-15

## 2018-03-15 VITALS
DIASTOLIC BLOOD PRESSURE: 70 MMHG | BODY MASS INDEX: 24.33 KG/M2 | SYSTOLIC BLOOD PRESSURE: 102 MMHG | WEIGHT: 155 LBS | HEIGHT: 67 IN

## 2018-03-15 DIAGNOSIS — R87.611 PAP SMEAR OF CERVIX WITH ASCUS, CANNOT EXCLUDE HGSIL: ICD-10-CM

## 2018-03-15 DIAGNOSIS — Z32.02 PREGNANCY EXAMINATION OR TEST, NEGATIVE RESULT: Primary | ICD-10-CM

## 2018-03-15 LAB — CONTROL LINE PRESENT WITH A CLEAR BACKGROUND (YES/NO): YES YES/NO

## 2018-03-15 PROCEDURE — 57452 EXAM OF CERVIX W/SCOPE: CPT | Performed by: OBSTETRICS & GYNECOLOGY

## 2018-03-15 PROCEDURE — 81025 URINE PREGNANCY TEST: CPT | Performed by: OBSTETRICS & GYNECOLOGY

## 2018-03-15 NOTE — PATIENT INSTRUCTIONS
[facility] Department of OB/GYN  After Care Instructions for Colposcopy/Biopsy      Biopsy Results   You will receive a phone call with your biopsy results in 7 business days. If you have not received your biopsy results in 7 days, please contact our office.   Karla Palmer

## 2018-03-15 NOTE — PROGRESS NOTES
Here for colposcopy due to recent pap smear suggesting ASCUS changes, cannot exclude HGSIL. HPV not run with this pap smear. Prior pap smears all normal including negative HPV testing one year ago.   Results of pap smear discussed in detail, including risk

## 2018-03-23 ENCOUNTER — HOSPITAL ENCOUNTER (OUTPATIENT)
Dept: MAMMOGRAPHY | Age: 38
Discharge: HOME | End: 2018-03-23
Attending: OBSTETRICS & GYNECOLOGY
Payer: COMMERCIAL

## 2018-03-23 DIAGNOSIS — Z12.39 BREAST CANCER SCREENING: ICD-10-CM

## 2018-03-23 PROCEDURE — 77063 BREAST TOMOSYNTHESIS BI: CPT | Performed by: OBSTETRICS & GYNECOLOGY

## 2018-03-23 PROCEDURE — 77067 SCR MAMMO BI INCL CAD: CPT | Performed by: OBSTETRICS & GYNECOLOGY

## 2018-04-27 ENCOUNTER — HOSPITAL ENCOUNTER (OUTPATIENT)
Age: 38
Discharge: HOME | End: 2018-04-27
Attending: FAMILY MEDICINE
Payer: COMMERCIAL

## 2018-04-27 VITALS
HEART RATE: 71 BPM | TEMPERATURE: 98 F | SYSTOLIC BLOOD PRESSURE: 137 MMHG | OXYGEN SATURATION: 100 % | RESPIRATION RATE: 20 BRPM | DIASTOLIC BLOOD PRESSURE: 91 MMHG

## 2018-04-27 DIAGNOSIS — K21.00 GASTROESOPHAGEAL REFLUX DISEASE WITH ESOPHAGITIS: ICD-10-CM

## 2018-04-27 DIAGNOSIS — R07.89 CHEST PAIN, ATYPICAL: Primary | ICD-10-CM

## 2018-04-27 PROCEDURE — 93005 ELECTROCARDIOGRAM TRACING: CPT

## 2018-04-27 PROCEDURE — 99214 OFFICE O/P EST MOD 30 MIN: CPT

## 2018-04-27 PROCEDURE — 93010 ELECTROCARDIOGRAM REPORT: CPT

## 2018-04-27 RX ORDER — OMEPRAZOLE 20 MG/1
CAPSULE, DELAYED RELEASE ORAL
Qty: 45 CAPSULE | Refills: 0 | Status: SHIPPED | OUTPATIENT
Start: 2018-04-27 | End: 2018-07-05 | Stop reason: ALTCHOICE

## 2018-04-27 NOTE — ED INITIAL ASSESSMENT (HPI)
Complains of chest pressure, epigastric pain and \"feeling like something is stuck on throat\" for the last three days. Denies shortness of breath.

## 2018-04-27 NOTE — ED PROVIDER NOTES
Patient Seen in: [facility]    History   Patient presents with:  Chest Pressure    Stated Complaint: chest pain, x3days     Intermittent mild sharp chest pain x 3 days. Unchanged. Pain is mild to moderate.   Also feeling of scr LMP 04/02/2018 (Exact Date)   SpO2 100%         Physical Exam   Constitutional: She appears well-developed and well-nourished. No distress. Eyes: Conjunctivae are normal. Right eye exhibits no discharge. Left eye exhibits no discharge.  No scleral icterus

## 2018-05-01 ENCOUNTER — OFFICE VISIT (OUTPATIENT)
Dept: FAMILY MEDICINE CLINIC | Facility: CLINIC | Age: 38
End: 2018-05-01

## 2018-05-01 VITALS
WEIGHT: 164 LBS | HEART RATE: 74 BPM | RESPIRATION RATE: 16 BRPM | OXYGEN SATURATION: 100 % | SYSTOLIC BLOOD PRESSURE: 116 MMHG | HEIGHT: 67 IN | BODY MASS INDEX: 25.74 KG/M2 | TEMPERATURE: 98 F | DIASTOLIC BLOOD PRESSURE: 78 MMHG

## 2018-05-01 DIAGNOSIS — M54.50 CHRONIC BILATERAL LOW BACK PAIN WITHOUT SCIATICA: Primary | ICD-10-CM

## 2018-05-01 DIAGNOSIS — E66.3 OVERWEIGHT (BMI 25.0-29.9): ICD-10-CM

## 2018-05-01 DIAGNOSIS — G89.29 CHRONIC BILATERAL LOW BACK PAIN WITHOUT SCIATICA: Primary | ICD-10-CM

## 2018-05-01 PROCEDURE — 99213 OFFICE O/P EST LOW 20 MIN: CPT | Performed by: FAMILY MEDICINE

## 2018-05-01 RX ORDER — CYCLOBENZAPRINE HCL 5 MG
TABLET ORAL NIGHTLY PRN
Qty: 24 TABLET | Refills: 0 | Status: SHIPPED | OUTPATIENT
Start: 2018-05-01 | End: 2018-07-05 | Stop reason: ALTCHOICE

## 2018-05-01 RX ORDER — PHENTERMINE HYDROCHLORIDE 15 MG/1
15 CAPSULE ORAL EVERY MORNING
Qty: 30 CAPSULE | Refills: 5 | Status: SHIPPED | OUTPATIENT
Start: 2018-05-01 | End: 2018-10-19 | Stop reason: ALTCHOICE

## 2018-05-01 NOTE — PROGRESS NOTES
HPI:    Patient ID: Ulices Roach is a 30year old female. Pt states her chest pain is doing better    Pt states she's overweight and has requested to be on medication for weight loss. Her BMI today was 25.68 and has tried diet change and exercise. exhibits no discharge. Cardiovascular: Normal rate, regular rhythm and normal heart sounds. Pulmonary/Chest: Effort normal and breath sounds normal. No respiratory distress. She has no wheezes. She has no rales.    Musculoskeletal: She exhibits tendern

## 2018-07-05 ENCOUNTER — HOSPITAL ENCOUNTER (OUTPATIENT)
Age: 38
Discharge: HOME | End: 2018-07-05
Attending: FAMILY MEDICINE
Payer: COMMERCIAL

## 2018-07-05 ENCOUNTER — LAB ENCOUNTER (OUTPATIENT)
Dept: LAB | Age: 38
End: 2018-07-05
Attending: OBSTETRICS & GYNECOLOGY
Payer: COMMERCIAL

## 2018-07-05 VITALS
SYSTOLIC BLOOD PRESSURE: 129 MMHG | OXYGEN SATURATION: 100 % | TEMPERATURE: 98 F | DIASTOLIC BLOOD PRESSURE: 92 MMHG | RESPIRATION RATE: 16 BRPM | HEART RATE: 71 BPM

## 2018-07-05 DIAGNOSIS — N97.9 FEMALE INFERTILITY: ICD-10-CM

## 2018-07-05 DIAGNOSIS — B02.9 HERPES ZOSTER WITHOUT COMPLICATION: Primary | ICD-10-CM

## 2018-07-05 DIAGNOSIS — N97.9 INFERTILITY, FEMALE: ICD-10-CM

## 2018-07-05 LAB
ANTIBODY SCREEN: NEGATIVE
BASOPHILS # BLD AUTO: 0.04 X10(3) UL (ref 0–0.1)
BASOPHILS NFR BLD AUTO: 0.7 %
EOSINOPHIL # BLD AUTO: 0.23 X10(3) UL (ref 0–0.3)
EOSINOPHIL NFR BLD AUTO: 4 %
ERYTHROCYTE [DISTWIDTH] IN BLOOD BY AUTOMATED COUNT: 12.4 % (ref 11.5–16)
HBV SURFACE AG SERPL QL IA: NONREACTIVE
HCG URINE QUALITATIVE: NEGATIVE
HCT VFR BLD AUTO: 41.6 % (ref 34–50)
HEPATITIS B SURFACE ANTIGEN INDEX: <0.1
HEPATITIS C VIRUS AB INTERPRETATION: NONREACTIVE
HGB BLD-MCNC: 13.4 G/DL (ref 12–16)
IMMATURE GRANULOCYTE COUNT: 0.03 X10(3) UL (ref 0–1)
IMMATURE GRANULOCYTE RATIO %: 0.5 %
LYMPHOCYTES # BLD AUTO: 1.04 X10(3) UL (ref 0.9–4)
LYMPHOCYTES NFR BLD AUTO: 18.1 %
MCH RBC QN AUTO: 30.4 PG (ref 27–33.2)
MCHC RBC AUTO-ENTMCNC: 32.2 G/DL (ref 31–37)
MCV RBC AUTO: 94.3 FL (ref 81–100)
MONOCYTES # BLD AUTO: 0.63 X10(3) UL (ref 0.1–1)
MONOCYTES NFR BLD AUTO: 11 %
NEUTROPHIL ABS PRELIM: 3.77 X10 (3) UL (ref 1.3–6.7)
NEUTROPHILS # BLD AUTO: 3.77 X10(3) UL (ref 1.3–6.7)
NEUTROPHILS NFR BLD AUTO: 65.7 %
PLATELET # BLD AUTO: 266 10(3)UL (ref 150–450)
PROLACTIN: 13.3 NG/ML
RBC # BLD AUTO: 4.41 X10(6)UL (ref 3.8–5.1)
RED CELL DISTRIBUTION WIDTH-SD: 43.6 FL (ref 35.1–46.3)
RH BLOOD TYPE: POSITIVE
RUBELLA IGG QUANTITATIVE: >500 IU/ML (ref 10–?)
RUBV IGG SER QL: POSITIVE
T PALLIDUM AB SER QL IA: NONREACTIVE
TSI SER-ACNC: 0.84 MIU/ML (ref 0.35–5.5)
WBC # BLD AUTO: 5.7 X10(3) UL (ref 4–13)

## 2018-07-05 PROCEDURE — 87389 HIV-1 AG W/HIV-1&-2 AB AG IA: CPT

## 2018-07-05 PROCEDURE — 86901 BLOOD TYPING SEROLOGIC RH(D): CPT

## 2018-07-05 PROCEDURE — 86850 RBC ANTIBODY SCREEN: CPT

## 2018-07-05 PROCEDURE — 87340 HEPATITIS B SURFACE AG IA: CPT

## 2018-07-05 PROCEDURE — 84146 ASSAY OF PROLACTIN: CPT

## 2018-07-05 PROCEDURE — 86780 TREPONEMA PALLIDUM: CPT

## 2018-07-05 PROCEDURE — 36415 COLL VENOUS BLD VENIPUNCTURE: CPT

## 2018-07-05 PROCEDURE — 81025 URINE PREGNANCY TEST: CPT

## 2018-07-05 PROCEDURE — 86900 BLOOD TYPING SEROLOGIC ABO: CPT

## 2018-07-05 PROCEDURE — 99213 OFFICE O/P EST LOW 20 MIN: CPT

## 2018-07-05 PROCEDURE — 99214 OFFICE O/P EST MOD 30 MIN: CPT

## 2018-07-05 PROCEDURE — 83520 IMMUNOASSAY QUANT NOS NONAB: CPT

## 2018-07-05 PROCEDURE — 86787 VARICELLA-ZOSTER ANTIBODY: CPT

## 2018-07-05 PROCEDURE — 82627 DEHYDROEPIANDROSTERONE: CPT

## 2018-07-05 PROCEDURE — 84443 ASSAY THYROID STIM HORMONE: CPT

## 2018-07-05 PROCEDURE — 86803 HEPATITIS C AB TEST: CPT

## 2018-07-05 PROCEDURE — 86762 RUBELLA ANTIBODY: CPT

## 2018-07-05 PROCEDURE — 85025 COMPLETE CBC W/AUTO DIFF WBC: CPT

## 2018-07-05 RX ORDER — NAPROXEN 500 MG/1
500 TABLET ORAL 2 TIMES DAILY PRN
Qty: 20 TABLET | Refills: 0 | Status: SHIPPED | OUTPATIENT
Start: 2018-07-05 | End: 2018-07-12

## 2018-07-05 RX ORDER — ACYCLOVIR 800 MG/1
800 TABLET ORAL
Qty: 35 TABLET | Refills: 0 | Status: SHIPPED | OUTPATIENT
Start: 2018-07-05 | End: 2018-07-12

## 2018-07-05 RX ORDER — ACETAMINOPHEN AND CODEINE PHOSPHATE 300; 30 MG/1; MG/1
1-2 TABLET ORAL EVERY 4 HOURS PRN
Qty: 10 TABLET | Refills: 0 | Status: SHIPPED | OUTPATIENT
Start: 2018-07-05 | End: 2018-07-12

## 2018-07-05 NOTE — ED INITIAL ASSESSMENT (HPI)
Pt presents with rash to left inner wrist and axilla. States is a painful itchy rash - noticed after shower this morning.

## 2018-07-05 NOTE — ED PROVIDER NOTES
Patient Seen in: [facility]    History   Patient presents with:  Rash Skin Problem (integumentary)    Stated Complaint: 5 days swelling arm pit area,3 days rash    HPI    70-year-old female with a past medical history of rhiniti and vital signs reviewed. All other systems reviewed and negative except as noted above.     Physical Exam   ED Triage Vitals [07/05/18 0851]  BP: (!) 129/92  Pulse: 71  Resp: 16  Temp: 98 °F (36.7 °C)  Temp src: Temporal  SpO2: 100 %  O2 Device: None 1-2 tablets by mouth every 4 (four) hours as needed for Pain., Print, Disp-10 tablet, R-0

## 2018-07-06 ENCOUNTER — TELEPHONE (OUTPATIENT)
Dept: OBGYN CLINIC | Facility: CLINIC | Age: 38
End: 2018-07-06

## 2018-07-06 LAB — VZV IGG SER IA-ACNC: POSITIVE

## 2018-07-06 NOTE — TELEPHONE ENCOUNTER
Patient called, she wants to speak to a nurse regarding some test she had here and she thinks Dr. Merline Bame from [place] [address] did not have and did not get,

## 2018-07-06 NOTE — TELEPHONE ENCOUNTER
Pt needs all records faxed to Dr. Bob Fernández office. Recent labs, sonohysterogram and pap. Labs pending; pt had done 7/5/18. Fax all results when available. Pt states Dr. Justin Jeter requesting disc from [facility]; pt advised will check with Alex.

## 2018-07-07 LAB — ANTI-MULLERIAN HORMONE: 1.93 NG/ML

## 2018-07-09 LAB
DHEA SULFATE, SERUM: 317 UG/DL
INHIBIN B: 12 PG/ML

## 2018-07-18 NOTE — PROGRESS NOTES
All results for IVF done. Elevated DHEAS consistent with PCOS. All results should be sent to Dr Huang Cain.

## 2018-07-19 NOTE — PROGRESS NOTES
Left message for patient to call back. All records have been faxed. "[employer] confirmed that we cannot make disc.

## 2018-09-27 ENCOUNTER — TELEPHONE (OUTPATIENT)
Dept: FAMILY MEDICINE CLINIC | Facility: CLINIC | Age: 38
End: 2018-09-27

## 2018-09-27 ENCOUNTER — APPOINTMENT (OUTPATIENT)
Dept: LAB | Age: 38
End: 2018-09-27
Attending: FAMILY MEDICINE
Payer: COMMERCIAL

## 2018-09-27 ENCOUNTER — OFFICE VISIT (OUTPATIENT)
Dept: FAMILY MEDICINE CLINIC | Facility: CLINIC | Age: 38
End: 2018-09-27

## 2018-09-27 ENCOUNTER — LAB ENCOUNTER (OUTPATIENT)
Dept: LAB | Age: 38
End: 2018-09-27
Attending: FAMILY MEDICINE
Payer: COMMERCIAL

## 2018-09-27 VITALS
DIASTOLIC BLOOD PRESSURE: 78 MMHG | SYSTOLIC BLOOD PRESSURE: 116 MMHG | WEIGHT: 154 LBS | TEMPERATURE: 97 F | HEART RATE: 65 BPM | OXYGEN SATURATION: 99 % | BODY MASS INDEX: 24.17 KG/M2 | RESPIRATION RATE: 18 BRPM | HEIGHT: 67 IN

## 2018-09-27 DIAGNOSIS — Z98.82 BREAST IMPLANT STATUS: ICD-10-CM

## 2018-09-27 DIAGNOSIS — Z01.818 PREOP EXAMINATION: Primary | ICD-10-CM

## 2018-09-27 DIAGNOSIS — Z01.818 PREOP EXAMINATION: ICD-10-CM

## 2018-09-27 DIAGNOSIS — K59.00 CONSTIPATION, UNSPECIFIED CONSTIPATION TYPE: ICD-10-CM

## 2018-09-27 LAB
APTT PPP: 29.2 SECONDS (ref 26.1–34.6)
ATRIAL RATE: 63 BPM
BASOPHILS # BLD AUTO: 0.04 X10(3) UL (ref 0–0.1)
BASOPHILS NFR BLD AUTO: 0.7 %
EOSINOPHIL # BLD AUTO: 0.36 X10(3) UL (ref 0–0.3)
EOSINOPHIL NFR BLD AUTO: 6.4 %
ERYTHROCYTE [DISTWIDTH] IN BLOOD BY AUTOMATED COUNT: 12.3 % (ref 11.5–16)
HCT VFR BLD AUTO: 39.8 % (ref 34–50)
HGB BLD-MCNC: 12.9 G/DL (ref 12–16)
IMMATURE GRANULOCYTE COUNT: 0.01 X10(3) UL (ref 0–1)
IMMATURE GRANULOCYTE RATIO %: 0.2 %
INR BLD: 1.06 (ref 0.9–1.1)
LYMPHOCYTES # BLD AUTO: 1.19 X10(3) UL (ref 0.9–4)
LYMPHOCYTES NFR BLD AUTO: 21.2 %
MCH RBC QN AUTO: 30.5 PG (ref 27–33.2)
MCHC RBC AUTO-ENTMCNC: 32.4 G/DL (ref 31–37)
MCV RBC AUTO: 94.1 FL (ref 81–100)
MONOCYTES # BLD AUTO: 0.55 X10(3) UL (ref 0.1–1)
MONOCYTES NFR BLD AUTO: 9.8 %
NEUTROPHIL ABS PRELIM: 3.46 X10 (3) UL (ref 1.3–6.7)
NEUTROPHILS # BLD AUTO: 3.46 X10(3) UL (ref 1.3–6.7)
NEUTROPHILS NFR BLD AUTO: 61.7 %
P AXIS: 59 DEGREES
P-R INTERVAL: 156 MS
PLATELET # BLD AUTO: 253 10(3)UL (ref 150–450)
PSA SERPL DL<=0.01 NG/ML-MCNC: 14.2 SECONDS (ref 12.4–14.7)
Q-T INTERVAL: 400 MS
QRS DURATION: 76 MS
QTC CALCULATION (BEZET): 409 MS
R AXIS: 59 DEGREES
RBC # BLD AUTO: 4.23 X10(6)UL (ref 3.8–5.1)
RED CELL DISTRIBUTION WIDTH-SD: 42.6 FL (ref 35.1–46.3)
T AXIS: 68 DEGREES
VENTRICULAR RATE: 63 BPM
WBC # BLD AUTO: 5.6 X10(3) UL (ref 4–13)

## 2018-09-27 PROCEDURE — 36415 COLL VENOUS BLD VENIPUNCTURE: CPT

## 2018-09-27 PROCEDURE — 93005 ELECTROCARDIOGRAM TRACING: CPT

## 2018-09-27 PROCEDURE — 93010 ELECTROCARDIOGRAM REPORT: CPT | Performed by: INTERNAL MEDICINE

## 2018-09-27 PROCEDURE — 85730 THROMBOPLASTIN TIME PARTIAL: CPT

## 2018-09-27 PROCEDURE — 85025 COMPLETE CBC W/AUTO DIFF WBC: CPT

## 2018-09-27 PROCEDURE — 99242 OFF/OP CONSLTJ NEW/EST SF 20: CPT | Performed by: FAMILY MEDICINE

## 2018-09-27 PROCEDURE — 85610 PROTHROMBIN TIME: CPT

## 2018-09-27 NOTE — TELEPHONE ENCOUNTER
Called Pt this morning to See if she was bringing in paperwork for her H&P, since we did not have anything. She did. I made a copy.  Her Surgery for breast Implants is on October 15th by Dr Modesta Wan MD. I put a copy in the blue accordion file on the

## 2018-09-27 NOTE — PROGRESS NOTES
HPI:    Patient ID: Lucina Calzada is a 41year old female. Pt came in for a Pre-Op exam for breast implants of b/l breasts. Pre-Op was requested by Dr. Bassam Durand MD (who will also be performing the procedure).  This procedure will take place on 1 SHORTNESS OF BREATH    Comment:sneezing  Hydrocodone-Acetami*    RASH   PHYSICAL EXAM:   Physical Exam   Constitutional: She appears well-developed and well-nourished. Eyes: Conjunctivae are normal. Right eye exhibits no discharge.  Left eye

## 2018-10-11 ENCOUNTER — TELEPHONE (OUTPATIENT)
Dept: FAMILY MEDICINE CLINIC | Facility: CLINIC | Age: 38
End: 2018-10-11

## 2018-10-11 NOTE — TELEPHONE ENCOUNTER
Pt having surgery on 10/15. She already had her appt.   She received clearance but still haven't gotten EKG and Lab work

## 2018-10-11 NOTE — TELEPHONE ENCOUNTER
EKG results \" Looks ok.  Slight abnormality which is not consistent with heart attack.  Looks ok otherwise.  No change compared to your previous EKG.  Do not worry about \"    Okay to proceed with surgery?

## 2018-10-11 NOTE — TELEPHONE ENCOUNTER
Farhan Roger from [facility] called to let us know that the ekg shows an abnormal result, can pt still get surgery, is pt clear for surgery? Pt has his surgery this Monday the 16th.  Please call and advise or fax to [phone]

## 2018-10-18 ENCOUNTER — TELEPHONE (OUTPATIENT)
Dept: FAMILY MEDICINE CLINIC | Facility: CLINIC | Age: 38
End: 2018-10-18

## 2018-10-18 DIAGNOSIS — S61.419A: ICD-10-CM

## 2018-10-18 DIAGNOSIS — Z01.818 PREOPERATIVE EXAMINATION: Primary | ICD-10-CM

## 2018-10-18 NOTE — TELEPHONE ENCOUNTER
Pt needs referral to Dr. Nubia Pritchard for stitch removal.  She was supposed to have a surgical procedure done but cut her hand so it was postponed.   She needs stitches out asap and Dr. Nubia Pritchard wants to remove to check on progress to see when procedure can be resc

## 2018-10-19 ENCOUNTER — OFFICE VISIT (OUTPATIENT)
Dept: FAMILY MEDICINE CLINIC | Facility: CLINIC | Age: 38
End: 2018-10-19

## 2018-10-19 VITALS
DIASTOLIC BLOOD PRESSURE: 74 MMHG | SYSTOLIC BLOOD PRESSURE: 122 MMHG | HEIGHT: 67 IN | HEART RATE: 60 BPM | BODY MASS INDEX: 24.8 KG/M2 | TEMPERATURE: 99 F | WEIGHT: 158 LBS | RESPIRATION RATE: 16 BRPM

## 2018-10-19 DIAGNOSIS — R94.31 EKG, ABNORMAL: Primary | ICD-10-CM

## 2018-10-19 PROCEDURE — 99213 OFFICE O/P EST LOW 20 MIN: CPT | Performed by: FAMILY MEDICINE

## 2018-10-19 NOTE — PROGRESS NOTES
HPI:    Patient ID: Noah Rivera is a 41year old female. Pt is here for a suture removal from her hand.  Pt had stiches places in her hand on 10/10 because she lacerated while filling up her , didn't notice a knife sticking up and slid her ha TREADMILL STRESS, ADULT (CPT=93017)       RU#[id number]

## 2018-10-22 ENCOUNTER — TELEPHONE (OUTPATIENT)
Dept: FAMILY MEDICINE CLINIC | Facility: CLINIC | Age: 38
End: 2018-10-22

## 2018-10-22 NOTE — TELEPHONE ENCOUNTER
Dr Modesta Wan is out of network for pt insurance. IHP will need   This provider is out of network. Member should be utilizing in network providers.  If there are no in network providers, member should be [employer] which is [facility] or [facility]

## 2019-04-29 ENCOUNTER — LAB ENCOUNTER (OUTPATIENT)
Dept: LAB | Age: 39
End: 2019-04-29
Attending: FAMILY MEDICINE
Payer: COMMERCIAL

## 2019-04-29 ENCOUNTER — OFFICE VISIT (OUTPATIENT)
Dept: FAMILY MEDICINE CLINIC | Facility: CLINIC | Age: 39
End: 2019-04-29

## 2019-04-29 VITALS
SYSTOLIC BLOOD PRESSURE: 120 MMHG | HEIGHT: 67 IN | RESPIRATION RATE: 18 BRPM | HEART RATE: 59 BPM | BODY MASS INDEX: 26.06 KG/M2 | TEMPERATURE: 98 F | DIASTOLIC BLOOD PRESSURE: 80 MMHG | OXYGEN SATURATION: 97 % | WEIGHT: 166 LBS

## 2019-04-29 DIAGNOSIS — Z00.00 ANNUAL PHYSICAL EXAM: ICD-10-CM

## 2019-04-29 DIAGNOSIS — K59.00 CONSTIPATION, UNSPECIFIED CONSTIPATION TYPE: ICD-10-CM

## 2019-04-29 DIAGNOSIS — R94.31 ABNORMAL EKG: ICD-10-CM

## 2019-04-29 DIAGNOSIS — K59.09 CHRONIC CONSTIPATION: ICD-10-CM

## 2019-04-29 DIAGNOSIS — Z00.00 ANNUAL PHYSICAL EXAM: Primary | ICD-10-CM

## 2019-04-29 PROCEDURE — 80061 LIPID PANEL: CPT

## 2019-04-29 PROCEDURE — 80053 COMPREHEN METABOLIC PANEL: CPT

## 2019-04-29 PROCEDURE — 84443 ASSAY THYROID STIM HORMONE: CPT

## 2019-04-29 PROCEDURE — 99395 PREV VISIT EST AGE 18-39: CPT | Performed by: FAMILY MEDICINE

## 2019-04-29 PROCEDURE — 36415 COLL VENOUS BLD VENIPUNCTURE: CPT

## 2019-04-29 PROCEDURE — 85025 COMPLETE CBC W/AUTO DIFF WBC: CPT

## 2019-04-29 PROCEDURE — 82306 VITAMIN D 25 HYDROXY: CPT

## 2019-04-29 RX ORDER — PHENTERMINE HYDROCHLORIDE 15 MG/1
15 CAPSULE ORAL EVERY MORNING
Qty: 90 CAPSULE | Refills: 1 | Status: SHIPPED | OUTPATIENT
Start: 2019-04-29 | End: 2019-04-29

## 2019-04-29 NOTE — H&P
HPI:   Param Miller is a 41year old female who presents for a well woman exam. Symptoms: denies discharge, itching, burning or dysuria. Pt complains of having constipation. This is a chronic problem that occurs intermittently.  Pt has been seeing the GI f Tobacco comment: 1 PK./WK. SINCE ABT 1999. Alcohol use: No      Alcohol/week: 0.6 oz      Types: 1 Standard drinks or equivalent per week      Comment: OCCAS. WINE. DENIES ABUSE HX.     Drug use: No      Exercise: minimal.  Diet: watches fats closely 26.0-26.9,adult  - We will decide to put pt on phentermine if her stress test comes back normal    3. Abnormal EKG  - CARD TREADMILL STRESS, ADULT (CPT=48087); Future    4.  Chronic constipation  - GASTRO - INTERNAL    Aguiartimur Dallas was given an opportunity to ask

## 2019-04-30 ENCOUNTER — HOSPITAL ENCOUNTER (OUTPATIENT)
Dept: CV DIAGNOSTICS | Facility: HOSPITAL | Age: 39
Discharge: HOME | End: 2019-04-30
Attending: FAMILY MEDICINE
Payer: COMMERCIAL

## 2019-04-30 DIAGNOSIS — R94.31 ABNORMAL EKG: ICD-10-CM

## 2019-04-30 PROCEDURE — 93018 CV STRESS TEST I&R ONLY: CPT | Performed by: FAMILY MEDICINE

## 2019-04-30 PROCEDURE — 93017 CV STRESS TEST TRACING ONLY: CPT | Performed by: FAMILY MEDICINE

## 2019-05-17 ENCOUNTER — OFFICE VISIT (OUTPATIENT)
Dept: OBGYN CLINIC | Facility: CLINIC | Age: 39
End: 2019-05-17

## 2019-05-17 VITALS
WEIGHT: 170 LBS | HEART RATE: 74 BPM | DIASTOLIC BLOOD PRESSURE: 80 MMHG | HEIGHT: 67 IN | BODY MASS INDEX: 26.68 KG/M2 | SYSTOLIC BLOOD PRESSURE: 124 MMHG

## 2019-05-17 DIAGNOSIS — Z01.419 WELL FEMALE EXAM WITH ROUTINE GYNECOLOGICAL EXAM: Primary | ICD-10-CM

## 2019-05-17 DIAGNOSIS — Z12.4 SCREENING FOR MALIGNANT NEOPLASM OF THE CERVIX: ICD-10-CM

## 2019-05-17 PROCEDURE — 88175 CYTOPATH C/V AUTO FLUID REDO: CPT | Performed by: OBSTETRICS & GYNECOLOGY

## 2019-05-17 PROCEDURE — 99395 PREV VISIT EST AGE 18-39: CPT | Performed by: OBSTETRICS & GYNECOLOGY

## 2019-05-17 NOTE — PROGRESS NOTES
Annual  No C/O  Menses normal  Kids are 15 and 12  Busy with real estate  They have put IVF on hold    ROS: No Cardiac, Respiratory, GI,  or Neurological symptoms.     PE:  GENERAL: well developed, well nourished, in no apparent distress alert oriented x

## 2019-05-28 ENCOUNTER — TELEPHONE (OUTPATIENT)
Dept: OBGYN CLINIC | Facility: CLINIC | Age: 39
End: 2019-05-28

## 2019-05-28 ENCOUNTER — HOSPITAL ENCOUNTER (OUTPATIENT)
Age: 39
Discharge: HOME | End: 2019-05-28
Attending: FAMILY MEDICINE
Payer: COMMERCIAL

## 2019-05-28 VITALS
HEART RATE: 102 BPM | RESPIRATION RATE: 18 BRPM | OXYGEN SATURATION: 99 % | SYSTOLIC BLOOD PRESSURE: 150 MMHG | DIASTOLIC BLOOD PRESSURE: 94 MMHG | TEMPERATURE: 99 F

## 2019-05-28 DIAGNOSIS — J02.9 SORE THROAT: ICD-10-CM

## 2019-05-28 DIAGNOSIS — J01.00 ACUTE NON-RECURRENT MAXILLARY SINUSITIS: Primary | ICD-10-CM

## 2019-05-28 PROCEDURE — 99214 OFFICE O/P EST MOD 30 MIN: CPT

## 2019-05-28 PROCEDURE — 87430 STREP A AG IA: CPT | Performed by: FAMILY MEDICINE

## 2019-05-28 PROCEDURE — 87081 CULTURE SCREEN ONLY: CPT | Performed by: FAMILY MEDICINE

## 2019-05-28 RX ORDER — AMOXICILLIN 875 MG/1
875 TABLET, COATED ORAL 2 TIMES DAILY
Qty: 20 TABLET | Refills: 0 | Status: SHIPPED | OUTPATIENT
Start: 2019-05-28 | End: 2019-07-18

## 2019-05-28 RX ORDER — PREDNISONE 20 MG/1
TABLET ORAL
Qty: 10 TABLET | Refills: 0 | Status: SHIPPED | OUTPATIENT
Start: 2019-05-28 | End: 2019-07-18

## 2019-05-28 RX ORDER — BENZONATATE 200 MG/1
200 CAPSULE ORAL 3 TIMES DAILY PRN
Qty: 30 CAPSULE | Refills: 0 | Status: SHIPPED | OUTPATIENT
Start: 2019-05-28 | End: 2019-06-07

## 2019-05-28 NOTE — TELEPHONE ENCOUNTER
PT states Dr. Arianna Schwab called her and told her she needs a colposcopy ASAP   There is no documentation in the chart   Please call PT to schedule this appointment there are no appointments available in system for  to schedule

## 2019-05-28 NOTE — TELEPHONE ENCOUNTER
Patient is looking for pap results. Is active on my chart but said she didn't see any results in my chart.

## 2019-05-28 NOTE — ED PROVIDER NOTES
Patient Seen in: [address] [facility]    History   Patient presents with:  Sore Throat    Stated Complaint: sore throat     HPI    This 28-year-old female presents to the office with a week history of worsening sore throat, sinus congesti HPI.  Constitutional and vital signs reviewed. All other systems reviewed and negative except as noted above.     Physical Exam     ED Triage Vitals [05/28/19 0843]   BP (!) 150/94   Pulse 102   Resp 18   Temp 98.7 °F (37.1 °C)   Temp src Oral   SpO2 9 appointment as soon as possible for a visit in 3 days  If symptoms worsen        Medications Prescribed:  Current Discharge Medication List    START taking these medications    amoxicillin 875 MG Oral Tab  Take 1 tablet (875 mg total) by mouth 2 (two) time

## 2019-05-28 NOTE — TELEPHONE ENCOUNTER
Spoke to Dr. Angle Richmond. Patient does not need it ASAP she just needs to schedule in reasonable time frame (4 weeks or so). Please schedule.

## 2019-05-30 ENCOUNTER — TELEPHONE (OUTPATIENT)
Dept: URGENT CARE | Age: 39
End: 2019-05-30

## 2019-05-30 NOTE — TELEPHONE ENCOUNTER
Pt is scheduled  Future Appointments   Date Time Provider Lorenza Ying   7/1/2019  4:30 PM Chayito Ivory MD [facility]

## 2019-05-30 NOTE — ED NOTES
Called to patient and  verified for identification. Made aware of final negative strep culture result. No further questions when asked.

## 2019-07-18 ENCOUNTER — OFFICE VISIT (OUTPATIENT)
Dept: OBGYN CLINIC | Facility: CLINIC | Age: 39
End: 2019-07-18

## 2019-07-18 VITALS
SYSTOLIC BLOOD PRESSURE: 130 MMHG | BODY MASS INDEX: 25.74 KG/M2 | HEIGHT: 67 IN | DIASTOLIC BLOOD PRESSURE: 86 MMHG | WEIGHT: 164 LBS

## 2019-07-18 DIAGNOSIS — R87.613 PAPANICOLAOU SMEAR OF CERVIX WITH HIGH GRADE SQUAMOUS INTRAEPITHELIAL LESION (HGSIL): Primary | ICD-10-CM

## 2019-07-18 LAB — CONTROL LINE PRESENT WITH A CLEAR BACKGROUND (YES/NO): YES YES/NO

## 2019-07-18 PROCEDURE — 88305 TISSUE EXAM BY PATHOLOGIST: CPT | Performed by: OBSTETRICS & GYNECOLOGY

## 2019-07-18 PROCEDURE — 81025 URINE PREGNANCY TEST: CPT | Performed by: OBSTETRICS & GYNECOLOGY

## 2019-07-18 PROCEDURE — 88342 IMHCHEM/IMCYTCHM 1ST ANTB: CPT | Performed by: OBSTETRICS & GYNECOLOGY

## 2019-07-18 PROCEDURE — 57454 BX/CURETT OF CERVIX W/SCOPE: CPT | Performed by: OBSTETRICS & GYNECOLOGY

## 2019-07-18 NOTE — PROGRESS NOTES
Negative PAP/HPV 2017  ASCUS/HG 2018 and negative colposcopy by Dr. Melody Martin  History of abnormal PAP 2015  PAP 5/2019 high grade  Discussed colposcopy, dysplasia, HPV    ROS: No Cardiac, Respiratory, GI,  or Neurological symptoms.     Colposcopy    SCJ see

## 2019-07-24 ENCOUNTER — TELEPHONE (OUTPATIENT)
Dept: OBGYN CLINIC | Facility: CLINIC | Age: 39
End: 2019-07-24

## 2019-07-24 NOTE — TELEPHONE ENCOUNTER
Patient sees in My Chart that she got her results back from her biopsy. She has a hard time understanding what it says. Please call patient with results from her biopsy. Patient is at work until [facility] today, please call at work.

## 2019-07-24 NOTE — TELEPHONE ENCOUNTER
Patient informed that pathology showed low grade as well as high grade lesion. Informed patient that she will most likely need procedure to remove abnormality. She has appt with Dr. Myrna Prieot tomorrow and will discuss further with him at that time.  No further q

## 2019-07-25 ENCOUNTER — OFFICE VISIT (OUTPATIENT)
Dept: OBGYN CLINIC | Facility: CLINIC | Age: 39
End: 2019-07-25

## 2019-07-25 VITALS
WEIGHT: 164.81 LBS | HEIGHT: 67 IN | SYSTOLIC BLOOD PRESSURE: 114 MMHG | DIASTOLIC BLOOD PRESSURE: 66 MMHG | BODY MASS INDEX: 25.87 KG/M2

## 2019-07-25 DIAGNOSIS — N87.1 CIN II (CERVICAL INTRAEPITHELIAL NEOPLASIA II): Primary | ICD-10-CM

## 2019-07-25 PROCEDURE — 99213 OFFICE O/P EST LOW 20 MIN: CPT | Performed by: OBSTETRICS & GYNECOLOGY

## 2019-07-30 ENCOUNTER — TELEPHONE (OUTPATIENT)
Dept: OBGYN CLINIC | Facility: CLINIC | Age: 39
End: 2019-07-30

## 2019-07-31 NOTE — TELEPHONE ENCOUNTER
Patient scheduled for 09/23/19 @ 0738. Verbalized understanding. Referral entered. Post op appointment scheduled. No further questions or concerns. Added to calendar.

## 2019-08-08 NOTE — TELEPHONE ENCOUNTER
Pt surgery time pushed back to 0900. Called cell number and person answering states the patient is no longer at this number. Called work number, pt is on vacation. [employer] message sent.

## 2019-09-06 ENCOUNTER — TELEPHONE (OUTPATIENT)
Dept: FAMILY MEDICINE CLINIC | Facility: CLINIC | Age: 39
End: 2019-09-06

## 2019-09-06 ENCOUNTER — TELEPHONE (OUTPATIENT)
Dept: OBGYN CLINIC | Facility: CLINIC | Age: 39
End: 2019-09-06

## 2019-09-06 DIAGNOSIS — Z01.818 PRE-OP TESTING: Primary | ICD-10-CM

## 2019-09-06 NOTE — TELEPHONE ENCOUNTER
Patient is scheduled for cold knife biopsy on 09/23/19. She stated she is possibly thinking of having more children through IVF. She wants to make sure that this procedure will not hinder any future pregnancies.    Advised patient if she wants to have more

## 2019-09-06 NOTE — TELEPHONE ENCOUNTER
Pt has a question on her upcoming procedure    Please call at work # after [facility]

## 2019-09-11 NOTE — TELEPHONE ENCOUNTER
Contacted patient. Let her know that unfortunately, due to a family emergency, we need to reschedule her surgery. Asked if she would be able to reschedule for 9/30 at around [id number]. Patient agrees to change in date.    Also inquired about best contact nu

## 2019-09-13 NOTE — TELEPHONE ENCOUNTER
Dr. Delfina Solorzano spoke with patient today about questions she had regarding having children and if the cone procedure is right for her if she desires more children. Plan of care changed. Will repeat pap in 3 months and cancel surgery.   Surgery form updated and fax

## 2019-09-19 ENCOUNTER — HOSPITAL ENCOUNTER (OUTPATIENT)
Age: 39
Discharge: HOME | End: 2019-09-19
Attending: FAMILY MEDICINE
Payer: COMMERCIAL

## 2019-09-19 VITALS
HEART RATE: 78 BPM | RESPIRATION RATE: 16 BRPM | DIASTOLIC BLOOD PRESSURE: 83 MMHG | OXYGEN SATURATION: 97 % | SYSTOLIC BLOOD PRESSURE: 135 MMHG | TEMPERATURE: 99 F

## 2019-09-19 DIAGNOSIS — M47.26 OSTEOARTHRITIS OF SPINE WITH RADICULOPATHY, LUMBAR REGION: Primary | ICD-10-CM

## 2019-09-19 PROCEDURE — 99213 OFFICE O/P EST LOW 20 MIN: CPT

## 2019-09-19 PROCEDURE — 99214 OFFICE O/P EST MOD 30 MIN: CPT

## 2019-09-19 RX ORDER — IBUPROFEN 600 MG/1
600 TABLET ORAL EVERY 8 HOURS PRN
Qty: 30 TABLET | Refills: 0 | Status: SHIPPED | OUTPATIENT
Start: 2019-09-19 | End: 2019-09-29

## 2019-09-19 RX ORDER — CYCLOBENZAPRINE HCL 10 MG
10 TABLET ORAL NIGHTLY
Qty: 7 TABLET | Refills: 0 | Status: SHIPPED | OUTPATIENT
Start: 2019-09-19 | End: 2019-09-26

## 2019-09-19 NOTE — ED PROVIDER NOTES
Patient Seen in: [facility]      History   Patient presents with:  Back Pain (musculoskeletal)    Stated Complaint: lower back pain,left side    HPI    42-year-old female history of lumbar disc disease and occasional radiculopat session: 1 or 2      Binge frequency: Never    Drug use: No             Review of Systems    Positive for stated complaint: lower back pain,left side  Other systems are as noted in HPI. Constitutional and vital signs reviewed.       All other systems revie [id number] [address] [facility]    Go in 4 weeks  As needed        Medications Prescribed:  Discharge Medication List as of 9/19/2019  2:01 PM    START taking these medications    Cyclobenzaprine HCl 10 MG Oral Tab  Take 1 tablet (10 mg total)

## 2019-09-19 NOTE — ED INITIAL ASSESSMENT (HPI)
Complains of low back pain radiating to left leg since yesterday. Denies any injury although patient has been bending a lot and moving boxes at work.

## 2019-12-20 ENCOUNTER — OFFICE VISIT (OUTPATIENT)
Dept: OBGYN CLINIC | Facility: CLINIC | Age: 39
End: 2019-12-20

## 2019-12-20 VITALS
SYSTOLIC BLOOD PRESSURE: 120 MMHG | BODY MASS INDEX: 26.08 KG/M2 | WEIGHT: 166.19 LBS | HEIGHT: 67 IN | DIASTOLIC BLOOD PRESSURE: 72 MMHG

## 2019-12-20 DIAGNOSIS — N87.1 CIN II (CERVICAL INTRAEPITHELIAL NEOPLASIA II): Primary | ICD-10-CM

## 2019-12-20 PROCEDURE — 88175 CYTOPATH C/V AUTO FLUID REDO: CPT | Performed by: OBSTETRICS & GYNECOLOGY

## 2019-12-20 PROCEDURE — 99213 OFFICE O/P EST LOW 20 MIN: CPT | Performed by: OBSTETRICS & GYNECOLOGY

## 2019-12-20 PROCEDURE — 87624 HPV HI-RISK TYP POOLED RSLT: CPT | Performed by: OBSTETRICS & GYNECOLOGY

## 2019-12-20 NOTE — PROGRESS NOTES
LILY II  Was scheduled for cone, wants pregnancy  Had TL  Will be going through Dr. Ama Collazo  Here for follow up PAP    ROS: No Cardiac, Respiratory, GI,  or Neurological symptoms.     PE:  Abdomen soft, non tender  Pelvic:External vag normal, cervix without

## 2020-06-18 ENCOUNTER — OFFICE VISIT (OUTPATIENT)
Dept: FAMILY MEDICINE CLINIC | Facility: CLINIC | Age: 40
End: 2020-06-18

## 2020-06-18 VITALS
DIASTOLIC BLOOD PRESSURE: 80 MMHG | BODY MASS INDEX: 27.62 KG/M2 | SYSTOLIC BLOOD PRESSURE: 124 MMHG | HEIGHT: 67 IN | OXYGEN SATURATION: 97 % | WEIGHT: 176 LBS | HEART RATE: 76 BPM | TEMPERATURE: 98 F | RESPIRATION RATE: 16 BRPM

## 2020-06-18 DIAGNOSIS — E66.3 OVERWEIGHT (BMI 25.0-29.9): ICD-10-CM

## 2020-06-18 DIAGNOSIS — E55.9 VITAMIN D DEFICIENCY: ICD-10-CM

## 2020-06-18 DIAGNOSIS — R94.31 ABNORMAL EKG: ICD-10-CM

## 2020-06-18 DIAGNOSIS — K59.09 CHRONIC CONSTIPATION: ICD-10-CM

## 2020-06-18 DIAGNOSIS — Z00.00 ANNUAL PHYSICAL EXAM: Primary | ICD-10-CM

## 2020-06-18 PROCEDURE — 99395 PREV VISIT EST AGE 18-39: CPT | Performed by: FAMILY MEDICINE

## 2020-06-18 PROCEDURE — 99212 OFFICE O/P EST SF 10 MIN: CPT | Performed by: FAMILY MEDICINE

## 2020-06-18 RX ORDER — PHENTERMINE HYDROCHLORIDE 37.5 MG/1
37.5 TABLET ORAL
Qty: 30 TABLET | Refills: 3 | Status: SHIPPED | OUTPATIENT
Start: 2020-06-18 | End: 2020-08-21

## 2020-06-20 NOTE — H&P
HPI:   Noah Rivera is a 47year old female who presents for a well woman exam. Symptoms: denies discharge, itching, burning or dysuria. Overweight. Wants phentermine to help her loose weight. Has been working on diet and exercise without any relief. Breast Cancer Maternal Aunt 48   • Other (Other) Sister         constipation      Social History:   Social History    Tobacco Use      Smoking status: Current Some Day Smoker        Years: 10.00        Types: Cigarettes        Quit date: 3/1/2016        Leobardo Warren DTRs.    ASSESSMENT AND PLAN:     1. Annual physical exam  Advised her that she needs to see gynocologist ASAP  - LIPID PANEL; Future  - CBC WITH DIFFERENTIAL WITH PLATELET; Future  - COMP METABOLIC PANEL (14);  Future  - TSH W REFLEX TO FREE T4; Future  -

## 2020-07-02 ENCOUNTER — LABORATORY ENCOUNTER (OUTPATIENT)
Dept: LAB | Age: 40
End: 2020-07-02
Attending: FAMILY MEDICINE
Payer: COMMERCIAL

## 2020-07-02 ENCOUNTER — APPOINTMENT (OUTPATIENT)
Dept: LAB | Age: 40
End: 2020-07-02
Attending: FAMILY MEDICINE
Payer: COMMERCIAL

## 2020-07-02 DIAGNOSIS — E55.9 VITAMIN D DEFICIENCY: ICD-10-CM

## 2020-07-02 DIAGNOSIS — Z01.818 PRE-OP TESTING: ICD-10-CM

## 2020-07-02 DIAGNOSIS — R94.31 ABNORMAL EKG: ICD-10-CM

## 2020-07-02 DIAGNOSIS — Z00.00 ANNUAL PHYSICAL EXAM: ICD-10-CM

## 2020-07-02 LAB
ALBUMIN SERPL-MCNC: 4 G/DL (ref 3.4–5)
ALBUMIN/GLOB SERPL: 1.3 {RATIO} (ref 1–2)
ALP LIVER SERPL-CCNC: 47 U/L (ref 37–98)
ALT SERPL-CCNC: 13 U/L (ref 13–56)
ANION GAP SERPL CALC-SCNC: 3 MMOL/L (ref 0–18)
AST SERPL-CCNC: 10 U/L (ref 15–37)
ATRIAL RATE: 66 BPM
BASOPHILS # BLD AUTO: 0.03 X10(3) UL (ref 0–0.2)
BASOPHILS NFR BLD AUTO: 0.5 %
BILIRUB SERPL-MCNC: 0.9 MG/DL (ref 0.1–2)
BUN BLD-MCNC: 8 MG/DL (ref 7–18)
BUN/CREAT SERPL: 9.9 (ref 10–20)
CALCIUM BLD-MCNC: 8.9 MG/DL (ref 8.5–10.1)
CHLORIDE SERPL-SCNC: 106 MMOL/L (ref 98–112)
CHOLEST SMN-MCNC: 124 MG/DL (ref ?–200)
CO2 SERPL-SCNC: 30 MMOL/L (ref 21–32)
CREAT BLD-MCNC: 0.81 MG/DL (ref 0.55–1.02)
DEPRECATED RDW RBC AUTO: 43 FL (ref 35.1–46.3)
EOSINOPHIL # BLD AUTO: 0.39 X10(3) UL (ref 0–0.7)
EOSINOPHIL NFR BLD AUTO: 6.5 %
ERYTHROCYTE [DISTWIDTH] IN BLOOD BY AUTOMATED COUNT: 12.7 % (ref 11–15)
GLOBULIN PLAS-MCNC: 3.1 G/DL (ref 2.8–4.4)
GLUCOSE BLD-MCNC: 92 MG/DL (ref 70–99)
HCT VFR BLD AUTO: 39.3 % (ref 35–48)
HDLC SERPL-MCNC: 65 MG/DL (ref 40–59)
HGB BLD-MCNC: 13 G/DL (ref 12–16)
IMM GRANULOCYTES # BLD AUTO: 0.01 X10(3) UL (ref 0–1)
IMM GRANULOCYTES NFR BLD: 0.2 %
LDLC SERPL CALC-MCNC: 47 MG/DL (ref ?–100)
LYMPHOCYTES # BLD AUTO: 1.24 X10(3) UL (ref 1–4)
LYMPHOCYTES NFR BLD AUTO: 20.8 %
M PROTEIN MFR SERPL ELPH: 7.1 G/DL (ref 6.4–8.2)
MCH RBC QN AUTO: 30.6 PG (ref 26–34)
MCHC RBC AUTO-ENTMCNC: 33.1 G/DL (ref 31–37)
MCV RBC AUTO: 92.5 FL (ref 80–100)
MONOCYTES # BLD AUTO: 0.63 X10(3) UL (ref 0.1–1)
MONOCYTES NFR BLD AUTO: 10.6 %
NEUTROPHILS # BLD AUTO: 3.67 X10 (3) UL (ref 1.5–7.7)
NEUTROPHILS # BLD AUTO: 3.67 X10(3) UL (ref 1.5–7.7)
NEUTROPHILS NFR BLD AUTO: 61.4 %
NONHDLC SERPL-MCNC: 59 MG/DL (ref ?–130)
OSMOLALITY SERPL CALC.SUM OF ELEC: 286 MOSM/KG (ref 275–295)
P AXIS: 57 DEGREES
P-R INTERVAL: 158 MS
PATIENT FASTING Y/N/NP: YES
PATIENT FASTING Y/N/NP: YES
PLATELET # BLD AUTO: 276 10(3)UL (ref 150–450)
POTASSIUM SERPL-SCNC: 4.4 MMOL/L (ref 3.5–5.1)
Q-T INTERVAL: 398 MS
QRS DURATION: 74 MS
QTC CALCULATION (BEZET): 417 MS
R AXIS: 48 DEGREES
RBC # BLD AUTO: 4.25 X10(6)UL (ref 3.8–5.3)
SODIUM SERPL-SCNC: 139 MMOL/L (ref 136–145)
T AXIS: 65 DEGREES
TRIGL SERPL-MCNC: 60 MG/DL (ref 30–149)
TSI SER-ACNC: 0.89 MIU/ML (ref 0.36–3.74)
VENTRICULAR RATE: 66 BPM
VIT D+METAB SERPL-MCNC: 42.5 NG/ML (ref 30–100)
VLDLC SERPL CALC-MCNC: 12 MG/DL (ref 0–30)
WBC # BLD AUTO: 6 X10(3) UL (ref 4–11)

## 2020-07-02 PROCEDURE — 84443 ASSAY THYROID STIM HORMONE: CPT

## 2020-07-02 PROCEDURE — 80053 COMPREHEN METABOLIC PANEL: CPT

## 2020-07-02 PROCEDURE — 36415 COLL VENOUS BLD VENIPUNCTURE: CPT

## 2020-07-02 PROCEDURE — 85025 COMPLETE CBC W/AUTO DIFF WBC: CPT

## 2020-07-02 PROCEDURE — 93010 ELECTROCARDIOGRAM REPORT: CPT | Performed by: INTERNAL MEDICINE

## 2020-07-02 PROCEDURE — 93005 ELECTROCARDIOGRAM TRACING: CPT

## 2020-07-02 PROCEDURE — 80061 LIPID PANEL: CPT

## 2020-07-02 PROCEDURE — 82306 VITAMIN D 25 HYDROXY: CPT

## 2020-07-15 ENCOUNTER — OCC HEALTH (OUTPATIENT)
Dept: OCCUPATIONAL MEDICINE | Age: 40
End: 2020-07-15
Attending: FAMILY MEDICINE

## 2020-07-15 ENCOUNTER — HOSPITAL ENCOUNTER (OUTPATIENT)
Dept: GENERAL RADIOLOGY | Age: 40
Discharge: HOME | End: 2020-07-15
Attending: FAMILY MEDICINE

## 2020-07-15 DIAGNOSIS — S49.91XA RIGHT SHOULDER INJURY, INITIAL ENCOUNTER: ICD-10-CM

## 2020-07-15 DIAGNOSIS — S49.91XA RIGHT SHOULDER INJURY, INITIAL ENCOUNTER: Primary | ICD-10-CM

## 2020-07-15 PROCEDURE — 73030 X-RAY EXAM OF SHOULDER: CPT | Performed by: FAMILY MEDICINE

## 2020-07-15 RX ORDER — NAPROXEN 500 MG/1
500 TABLET ORAL 2 TIMES DAILY WITH MEALS
Qty: 30 TABLET | Refills: 1 | Status: SHIPPED | OUTPATIENT
Start: 2020-07-15 | End: 2020-08-14

## 2020-07-29 ENCOUNTER — OCC HEALTH (OUTPATIENT)
Dept: OCCUPATIONAL MEDICINE | Age: 40
End: 2020-07-29
Attending: FAMILY MEDICINE

## 2020-08-12 ENCOUNTER — TELEPHONE (OUTPATIENT)
Dept: OBGYN CLINIC | Facility: CLINIC | Age: 40
End: 2020-08-12

## 2020-08-12 NOTE — TELEPHONE ENCOUNTER
Patient called stating she has an Appt with IVF Dr Foreign Blank referred her and stating she needs it by tomorrow     Thank you

## 2020-08-17 NOTE — TELEPHONE ENCOUNTER
Patient's referral was approved. Patient notified and verbalized understanding. She will  referral on Friday.

## 2020-08-21 ENCOUNTER — OFFICE VISIT (OUTPATIENT)
Dept: OBGYN CLINIC | Facility: CLINIC | Age: 40
End: 2020-08-21

## 2020-08-21 VITALS
HEIGHT: 67 IN | HEART RATE: 75 BPM | BODY MASS INDEX: 26.4 KG/M2 | DIASTOLIC BLOOD PRESSURE: 90 MMHG | WEIGHT: 168.19 LBS | SYSTOLIC BLOOD PRESSURE: 136 MMHG | TEMPERATURE: 98 F

## 2020-08-21 DIAGNOSIS — R87.613 PAPANICOLAOU SMEAR OF CERVIX WITH HIGH GRADE SQUAMOUS INTRAEPITHELIAL LESION (HGSIL): ICD-10-CM

## 2020-08-21 DIAGNOSIS — Z01.419 WELL FEMALE EXAM WITH ROUTINE GYNECOLOGICAL EXAM: Primary | ICD-10-CM

## 2020-08-21 PROCEDURE — 3075F SYST BP GE 130 - 139MM HG: CPT | Performed by: OBSTETRICS & GYNECOLOGY

## 2020-08-21 PROCEDURE — 99395 PREV VISIT EST AGE 18-39: CPT | Performed by: OBSTETRICS & GYNECOLOGY

## 2020-08-21 PROCEDURE — 88175 CYTOPATH C/V AUTO FLUID REDO: CPT | Performed by: OBSTETRICS & GYNECOLOGY

## 2020-08-21 PROCEDURE — 3080F DIAST BP >= 90 MM HG: CPT | Performed by: OBSTETRICS & GYNECOLOGY

## 2020-08-21 PROCEDURE — 3008F BODY MASS INDEX DOCD: CPT | Performed by: OBSTETRICS & GYNECOLOGY

## 2020-08-21 NOTE — PROGRESS NOTES
Annual  No C/O  Going through Dr. Lincoln Tran, IVF (tubal ligation)  Just found out partner, azospermia, repeated testing  LILY II, but 12/2019, PAP with ASCUS, negative HPV    ROS: No Cardiac, Respiratory, GI,  or Neurological symptoms.     PE:  GENERAL: well

## 2020-12-14 ENCOUNTER — APPOINTMENT (OUTPATIENT)
Dept: GENERAL RADIOLOGY | Age: 40
End: 2020-12-14
Attending: PHYSICIAN ASSISTANT
Payer: COMMERCIAL

## 2020-12-14 ENCOUNTER — HOSPITAL ENCOUNTER (OUTPATIENT)
Age: 40
Discharge: HOME | End: 2020-12-14
Payer: COMMERCIAL

## 2020-12-14 VITALS
SYSTOLIC BLOOD PRESSURE: 143 MMHG | TEMPERATURE: 97 F | OXYGEN SATURATION: 100 % | RESPIRATION RATE: 20 BRPM | HEART RATE: 84 BPM | DIASTOLIC BLOOD PRESSURE: 92 MMHG

## 2020-12-14 DIAGNOSIS — S16.1XXA CERVICAL MUSCLE STRAIN, INITIAL ENCOUNTER: Primary | ICD-10-CM

## 2020-12-14 DIAGNOSIS — M54.12 LEFT CERVICAL RADICULOPATHY: ICD-10-CM

## 2020-12-14 DIAGNOSIS — L20.9 ATOPIC DERMATITIS, UNSPECIFIED TYPE: ICD-10-CM

## 2020-12-14 PROCEDURE — 99213 OFFICE O/P EST LOW 20 MIN: CPT

## 2020-12-14 PROCEDURE — 72050 X-RAY EXAM NECK SPINE 4/5VWS: CPT | Performed by: PHYSICIAN ASSISTANT

## 2020-12-14 PROCEDURE — 99214 OFFICE O/P EST MOD 30 MIN: CPT

## 2020-12-14 RX ORDER — DEXAMETHASONE 4 MG/1
12 TABLET ORAL ONCE
Status: COMPLETED | OUTPATIENT
Start: 2020-12-14 | End: 2020-12-14

## 2020-12-14 RX ORDER — METHYLPREDNISOLONE 4 MG/1
TABLET ORAL
Qty: 1 PACKAGE | Refills: 0 | Status: SHIPPED | OUTPATIENT
Start: 2020-12-14 | End: 2021-06-24

## 2020-12-14 RX ORDER — CLOBETASOL PROPIONATE 0.5 MG/G
1 CREAM TOPICAL 2 TIMES DAILY
Qty: 1 TUBE | Refills: 0 | Status: SHIPPED | OUTPATIENT
Start: 2020-12-14 | End: 2021-09-02

## 2020-12-14 RX ORDER — CYCLOBENZAPRINE HCL 10 MG
10 TABLET ORAL 3 TIMES DAILY PRN
Qty: 20 TABLET | Refills: 0 | Status: SHIPPED | OUTPATIENT
Start: 2020-12-14 | End: 2020-12-21

## 2020-12-14 NOTE — ED PROVIDER NOTES
Patient Seen in: [facility] [place]      History   Patient presents with:  Shoulder Pain    Stated Complaint: left arm pain,no injury    HPI    37-year-old female who comes in today complaining of left trapezial pain and left lateral neck pain fo Binge frequency: Never      Comment: occ    Drug use: No             Review of Systems    Positive for stated complaint: left arm pain,no injury  Other systems are as noted in HPI. Constitutional and vital signs reviewed.       All other systems review General: Skin is warm and dry. Capillary Refill: Capillary refill takes less than 2 seconds. Coloration: Skin is not pale. Findings: Rash present. No erythema.              Comments: Rash consistent with atopic dermatitis over the left ant Patient has findings consistent with left cervical radiculopathy and trapezial strain. I have discussed with the patient findings on her x-ray. Patient was given a dose of Decadron here.   We discussed at home conservative care no heavy lifting, start med I have given the patient instructions regarding her diagnosis, expectations, follow up, and return to the ER precautions.   I explained to the patient that emergent conditions may arise to return to the immediate care or ER for new, worsening or any persist

## 2020-12-14 NOTE — ED INITIAL ASSESSMENT (HPI)
Pt has ad 9 days of left sided trapezius muscle pain that goes around to her shoulder and down her left arm and into her nick.   She gets some numbness and tingling to her fingers

## 2020-12-15 ENCOUNTER — TELEPHONE (OUTPATIENT)
Dept: SURGERY | Facility: CLINIC | Age: 40
End: 2020-12-15

## 2020-12-15 NOTE — TELEPHONE ENCOUNTER
Reached out to patient. Patient indicated she can not come in until after Yesica however she could not make appointment because she didn't have her schedule in front of her.   Patient indicated she would call after the holidays to schedule an appointment

## 2020-12-15 NOTE — TELEPHONE ENCOUNTER
Patient was seen in urgent care for shoulder and arm pain.   Please call and offer her an appointment this week

## 2021-06-24 ENCOUNTER — OFFICE VISIT (OUTPATIENT)
Dept: FAMILY MEDICINE CLINIC | Facility: CLINIC | Age: 41
End: 2021-06-24

## 2021-06-24 VITALS
TEMPERATURE: 98 F | HEART RATE: 62 BPM | BODY MASS INDEX: 26.84 KG/M2 | WEIGHT: 171 LBS | SYSTOLIC BLOOD PRESSURE: 110 MMHG | OXYGEN SATURATION: 100 % | RESPIRATION RATE: 20 BRPM | DIASTOLIC BLOOD PRESSURE: 82 MMHG | HEIGHT: 67 IN

## 2021-06-24 DIAGNOSIS — E66.3 OVERWEIGHT (BMI 25.0-29.9): ICD-10-CM

## 2021-06-24 DIAGNOSIS — E55.9 VITAMIN D DEFICIENCY: ICD-10-CM

## 2021-06-24 DIAGNOSIS — Z00.00 ANNUAL PHYSICAL EXAM: Primary | ICD-10-CM

## 2021-06-24 DIAGNOSIS — K59.09 CHRONIC CONSTIPATION: ICD-10-CM

## 2021-06-24 PROCEDURE — 99395 PREV VISIT EST AGE 18-39: CPT | Performed by: FAMILY MEDICINE

## 2021-06-24 PROCEDURE — 3074F SYST BP LT 130 MM HG: CPT | Performed by: FAMILY MEDICINE

## 2021-06-24 PROCEDURE — 3079F DIAST BP 80-89 MM HG: CPT | Performed by: FAMILY MEDICINE

## 2021-06-24 PROCEDURE — 3008F BODY MASS INDEX DOCD: CPT | Performed by: FAMILY MEDICINE

## 2021-06-24 RX ORDER — BUPROPION HYDROCHLORIDE 150 MG/1
150 TABLET ORAL DAILY
Qty: 30 TABLET | Refills: 1 | Status: SHIPPED | OUTPATIENT
Start: 2021-06-24 | End: 2021-08-31

## 2021-06-24 NOTE — H&P
HPI:   Jyoti Junior is a 46year old female who presents for a well woman exam. Symptoms: denies discharge, itching, burning or dysuria. Previous pap: 8/21/20  Performs SBEs: yes    Anxiety  This is a chronic problem.  The current episode started more th 3/14/06,9/2/08,12-    wnl   • PONV (postoperative nausea and vomiting)    • Visual impairment     glasses      Past Surgical History:   Procedure Laterality Date   • APPENDECTOMY  10/2013   • APPENDECTOMY  11/2013   • TANESHA LOCALIZATION WIRE 1 SITE LE pain  NEURO: denies headaches or dizziness  PSYCH: denies depression or anxiety  HEME: denies hx of anemia  ENDOCRINE: denies thyroid history, denies excessive thirst, denies significant weight change  ALL/ASTHMA: denies hx o allergy or asthma    EXAM:   B opportunity to ask questions and verbalized understanding of care.

## 2021-07-01 ENCOUNTER — LAB ENCOUNTER (OUTPATIENT)
Dept: LAB | Age: 41
End: 2021-07-01
Attending: FAMILY MEDICINE
Payer: COMMERCIAL

## 2021-07-01 DIAGNOSIS — E55.9 VITAMIN D DEFICIENCY: ICD-10-CM

## 2021-07-01 DIAGNOSIS — Z00.00 ANNUAL PHYSICAL EXAM: ICD-10-CM

## 2021-07-01 LAB
ALBUMIN SERPL-MCNC: 4 G/DL (ref 3.4–5)
ALBUMIN/GLOB SERPL: 1.4 {RATIO} (ref 1–2)
ALP LIVER SERPL-CCNC: 53 U/L
ALT SERPL-CCNC: 15 U/L
ANION GAP SERPL CALC-SCNC: 4 MMOL/L (ref 0–18)
AST SERPL-CCNC: 9 U/L (ref 15–37)
BASOPHILS # BLD AUTO: 0.03 X10(3) UL (ref 0–0.2)
BASOPHILS NFR BLD AUTO: 0.6 %
BILIRUB SERPL-MCNC: 1.1 MG/DL (ref 0.1–2)
BUN BLD-MCNC: 9 MG/DL (ref 7–18)
BUN/CREAT SERPL: 13 (ref 10–20)
CALCIUM BLD-MCNC: 8.7 MG/DL (ref 8.5–10.1)
CHLORIDE SERPL-SCNC: 106 MMOL/L (ref 98–112)
CHOLEST SMN-MCNC: 158 MG/DL (ref ?–200)
CO2 SERPL-SCNC: 30 MMOL/L (ref 21–32)
CREAT BLD-MCNC: 0.69 MG/DL
DEPRECATED RDW RBC AUTO: 41.9 FL (ref 35.1–46.3)
EOSINOPHIL # BLD AUTO: 0.36 X10(3) UL (ref 0–0.7)
EOSINOPHIL NFR BLD AUTO: 6.8 %
ERYTHROCYTE [DISTWIDTH] IN BLOOD BY AUTOMATED COUNT: 13.2 % (ref 11–15)
GLOBULIN PLAS-MCNC: 2.8 G/DL (ref 2.8–4.4)
GLUCOSE BLD-MCNC: 90 MG/DL (ref 70–99)
HCT VFR BLD AUTO: 37.7 %
HDLC SERPL-MCNC: 63 MG/DL (ref 40–59)
HGB BLD-MCNC: 12.2 G/DL
IMM GRANULOCYTES # BLD AUTO: 0.01 X10(3) UL (ref 0–1)
IMM GRANULOCYTES NFR BLD: 0.2 %
LDLC SERPL CALC-MCNC: 79 MG/DL (ref ?–100)
LYMPHOCYTES # BLD AUTO: 1.17 X10(3) UL (ref 1–4)
LYMPHOCYTES NFR BLD AUTO: 22.1 %
M PROTEIN MFR SERPL ELPH: 6.8 G/DL (ref 6.4–8.2)
MCH RBC QN AUTO: 28.2 PG (ref 26–34)
MCHC RBC AUTO-ENTMCNC: 32.4 G/DL (ref 31–37)
MCV RBC AUTO: 87.3 FL
MONOCYTES # BLD AUTO: 0.61 X10(3) UL (ref 0.1–1)
MONOCYTES NFR BLD AUTO: 11.5 %
NEUTROPHILS # BLD AUTO: 3.12 X10 (3) UL (ref 1.5–7.7)
NEUTROPHILS # BLD AUTO: 3.12 X10(3) UL (ref 1.5–7.7)
NEUTROPHILS NFR BLD AUTO: 58.8 %
NONHDLC SERPL-MCNC: 95 MG/DL (ref ?–130)
OSMOLALITY SERPL CALC.SUM OF ELEC: 288 MOSM/KG (ref 275–295)
PATIENT FASTING Y/N/NP: YES
PATIENT FASTING Y/N/NP: YES
PLATELET # BLD AUTO: 276 10(3)UL (ref 150–450)
POTASSIUM SERPL-SCNC: 3.9 MMOL/L (ref 3.5–5.1)
RBC # BLD AUTO: 4.32 X10(6)UL
SODIUM SERPL-SCNC: 140 MMOL/L (ref 136–145)
TRIGL SERPL-MCNC: 83 MG/DL (ref 30–149)
TSI SER-ACNC: 0.92 MIU/ML (ref 0.36–3.74)
VIT D+METAB SERPL-MCNC: 24.5 NG/ML (ref 30–100)
VLDLC SERPL CALC-MCNC: 13 MG/DL (ref 0–30)
WBC # BLD AUTO: 5.3 X10(3) UL (ref 4–11)

## 2021-07-01 PROCEDURE — 80053 COMPREHEN METABOLIC PANEL: CPT

## 2021-07-01 PROCEDURE — 80061 LIPID PANEL: CPT

## 2021-07-01 PROCEDURE — 36415 COLL VENOUS BLD VENIPUNCTURE: CPT

## 2021-07-01 PROCEDURE — 85025 COMPLETE CBC W/AUTO DIFF WBC: CPT

## 2021-07-01 PROCEDURE — 84443 ASSAY THYROID STIM HORMONE: CPT

## 2021-07-01 PROCEDURE — 82306 VITAMIN D 25 HYDROXY: CPT

## 2021-07-02 ENCOUNTER — TELEPHONE (OUTPATIENT)
Dept: FAMILY MEDICINE CLINIC | Facility: CLINIC | Age: 41
End: 2021-07-02

## 2021-08-16 ENCOUNTER — HOSPITAL ENCOUNTER (OUTPATIENT)
Dept: CT IMAGING | Age: 41
Discharge: HOME | End: 2021-08-16
Attending: PHYSICIAN ASSISTANT
Payer: COMMERCIAL

## 2021-08-16 DIAGNOSIS — R19.8 ABDOMINAL FULLNESS: ICD-10-CM

## 2021-08-16 DIAGNOSIS — R14.0 BLOATING: ICD-10-CM

## 2021-08-16 DIAGNOSIS — R10.13 EPIGASTRIC DISCOMFORT: ICD-10-CM

## 2021-08-16 DIAGNOSIS — R11.0 NAUSEA: ICD-10-CM

## 2021-08-16 DIAGNOSIS — K59.00 CONSTIPATION, UNSPECIFIED CONSTIPATION TYPE: ICD-10-CM

## 2021-08-16 LAB — CREAT BLD-MCNC: 0.7 MG/DL

## 2021-08-16 PROCEDURE — 82565 ASSAY OF CREATININE: CPT

## 2021-08-16 PROCEDURE — 74177 CT ABD & PELVIS W/CONTRAST: CPT | Performed by: PHYSICIAN ASSISTANT

## 2021-08-31 ENCOUNTER — OFFICE VISIT (OUTPATIENT)
Dept: OBGYN CLINIC | Facility: CLINIC | Age: 41
End: 2021-08-31

## 2021-08-31 ENCOUNTER — HOSPITAL ENCOUNTER (OUTPATIENT)
Age: 41
Discharge: HOME | End: 2021-08-31
Payer: COMMERCIAL

## 2021-08-31 VITALS
TEMPERATURE: 98 F | OXYGEN SATURATION: 99 % | SYSTOLIC BLOOD PRESSURE: 129 MMHG | RESPIRATION RATE: 16 BRPM | HEART RATE: 80 BPM | DIASTOLIC BLOOD PRESSURE: 96 MMHG

## 2021-08-31 VITALS
DIASTOLIC BLOOD PRESSURE: 78 MMHG | SYSTOLIC BLOOD PRESSURE: 118 MMHG | WEIGHT: 166 LBS | BODY MASS INDEX: 26.06 KG/M2 | HEIGHT: 67 IN

## 2021-08-31 DIAGNOSIS — Z01.419 WELL FEMALE EXAM WITH ROUTINE GYNECOLOGICAL EXAM: Primary | ICD-10-CM

## 2021-08-31 DIAGNOSIS — R87.613 PAPANICOLAOU SMEAR OF CERVIX WITH HIGH GRADE SQUAMOUS INTRAEPITHELIAL LESION (HGSIL): ICD-10-CM

## 2021-08-31 DIAGNOSIS — J02.9 VIRAL PHARYNGITIS: Primary | ICD-10-CM

## 2021-08-31 LAB — S PYO AG THROAT QL: NEGATIVE

## 2021-08-31 PROCEDURE — 87880 STREP A ASSAY W/OPTIC: CPT

## 2021-08-31 PROCEDURE — 3008F BODY MASS INDEX DOCD: CPT | Performed by: OBSTETRICS & GYNECOLOGY

## 2021-08-31 PROCEDURE — 99213 OFFICE O/P EST LOW 20 MIN: CPT

## 2021-08-31 PROCEDURE — 88175 CYTOPATH C/V AUTO FLUID REDO: CPT | Performed by: OBSTETRICS & GYNECOLOGY

## 2021-08-31 PROCEDURE — 3074F SYST BP LT 130 MM HG: CPT | Performed by: OBSTETRICS & GYNECOLOGY

## 2021-08-31 PROCEDURE — 99395 PREV VISIT EST AGE 18-39: CPT | Performed by: OBSTETRICS & GYNECOLOGY

## 2021-08-31 PROCEDURE — 3078F DIAST BP <80 MM HG: CPT | Performed by: OBSTETRICS & GYNECOLOGY

## 2021-08-31 NOTE — ED INITIAL ASSESSMENT (HPI)
c/o sore throat since Saturday on and off. Last night woke-up with chills. Exposed to an individual with positive Covid on Sunday.

## 2021-08-31 NOTE — ED PROVIDER NOTES
Patient Seen in: [facility] [place]      History   Patient presents with:  Sore Throat    Stated Complaint: sore throat,chills    HPI/Subjective:   HPI    Patient is a pleasant 21-year-old female.   48 hours prior to arrival she developed some low Systems    Positive for stated complaint: sore throat,chills  Other systems are as noted in HPI. Constitutional and vital signs reviewed. All other systems reviewed and negative except as noted above.     Physical Exam     ED Triage Vitals [08/31/21 1 Prescribed:  Current Discharge Medication List

## 2021-08-31 NOTE — PROGRESS NOTES
Annual  No C/O, menses normal  Youngest in HS, oldest starting college   with azospermia    ROS: No Cardiac, Respiratory, GI,  or Neurological symptoms.     PAP ASCUS/neg HPV 2019  Normal PAP 2020    PE:  GENERAL: well developed, well nourished, in

## 2021-09-01 ENCOUNTER — PATIENT MESSAGE (OUTPATIENT)
Dept: FAMILY MEDICINE CLINIC | Facility: CLINIC | Age: 41
End: 2021-09-01

## 2021-09-01 LAB — SARS-COV-2 RNA RESP QL NAA+PROBE: NOT DETECTED

## 2021-09-02 NOTE — TELEPHONE ENCOUNTER
From: Vivian Kingsley  To: Oma Query, DO  Sent: 9/1/2021 2:48 PM CDT  Subject: Prescription Question    Hello   Could I get a refill on a cream you prescribed me before for eczema? It worked very well and looks like eczema is back. Thank you.

## 2021-09-03 RX ORDER — CLOBETASOL PROPIONATE 0.5 MG/G
1 CREAM TOPICAL 2 TIMES DAILY
Qty: 1 EACH | Refills: 0 | Status: SHIPPED | OUTPATIENT
Start: 2021-09-03 | End: 2021-09-17

## 2021-09-21 RX ORDER — ERGOCALCIFEROL 1.25 MG/1
50000 CAPSULE ORAL WEEKLY
Qty: 12 CAPSULE | Refills: 0 | OUTPATIENT
Start: 2021-09-21

## 2021-12-15 ENCOUNTER — PATIENT MESSAGE (OUTPATIENT)
Dept: FAMILY MEDICINE CLINIC | Facility: CLINIC | Age: 41
End: 2021-12-15

## 2021-12-15 DIAGNOSIS — Z01.818 PRE-OP TESTING: Primary | ICD-10-CM

## 2021-12-15 NOTE — TELEPHONE ENCOUNTER
From: Myranda Boland  To: Evin Mathew DO  Sent: 12/15/2021 11:26 AM CST  Subject: Lab requested for surgery     Hello   I received lab requested for the upcoming surgery. I will attach it.  If you could enter put it in the system so I could schedule appoint

## 2021-12-21 ENCOUNTER — TELEPHONE (OUTPATIENT)
Dept: FAMILY MEDICINE CLINIC | Facility: CLINIC | Age: 41
End: 2021-12-21

## 2022-01-04 ENCOUNTER — LAB ENCOUNTER (OUTPATIENT)
Dept: LAB | Age: 42
End: 2022-01-04
Attending: FAMILY MEDICINE
Payer: COMMERCIAL

## 2022-01-04 DIAGNOSIS — Z01.818 PRE-OP TESTING: ICD-10-CM

## 2022-01-04 LAB
ALBUMIN SERPL-MCNC: 3.6 G/DL (ref 3.4–5)
ALBUMIN/GLOB SERPL: 1.2 {RATIO} (ref 1–2)
ALP LIVER SERPL-CCNC: 50 U/L
ALT SERPL-CCNC: 14 U/L
ANION GAP SERPL CALC-SCNC: 5 MMOL/L (ref 0–18)
APTT PPP: 33.6 SECONDS (ref 23.3–35.6)
AST SERPL-CCNC: 11 U/L (ref 15–37)
B-HCG SERPL-ACNC: <1 MIU/ML
BASOPHILS # BLD AUTO: 0.04 X10(3) UL (ref 0–0.2)
BASOPHILS NFR BLD AUTO: 0.6 %
BILIRUB SERPL-MCNC: 0.6 MG/DL (ref 0.1–2)
BUN BLD-MCNC: 9 MG/DL (ref 7–18)
CALCIUM BLD-MCNC: 8.2 MG/DL (ref 8.5–10.1)
CHLORIDE SERPL-SCNC: 108 MMOL/L (ref 98–112)
CO2 SERPL-SCNC: 26 MMOL/L (ref 21–32)
CREAT BLD-MCNC: 0.66 MG/DL
EOSINOPHIL # BLD AUTO: 0.41 X10(3) UL (ref 0–0.7)
EOSINOPHIL NFR BLD AUTO: 6.6 %
ERYTHROCYTE [DISTWIDTH] IN BLOOD BY AUTOMATED COUNT: 13.2 %
FASTING STATUS PATIENT QL REPORTED: YES
GLOBULIN PLAS-MCNC: 3 G/DL (ref 2.8–4.4)
GLUCOSE BLD-MCNC: 87 MG/DL (ref 70–99)
HCT VFR BLD AUTO: 38.5 %
HGB BLD-MCNC: 12.5 G/DL
IMM GRANULOCYTES # BLD AUTO: 0.02 X10(3) UL (ref 0–1)
IMM GRANULOCYTES NFR BLD: 0.3 %
INR BLD: 1.04 (ref 0.8–1.2)
LYMPHOCYTES # BLD AUTO: 1.4 X10(3) UL (ref 1–4)
LYMPHOCYTES NFR BLD AUTO: 22.5 %
MCH RBC QN AUTO: 29 PG (ref 26–34)
MCHC RBC AUTO-ENTMCNC: 32.5 G/DL (ref 31–37)
MCV RBC AUTO: 89.3 FL
MONOCYTES # BLD AUTO: 0.68 X10(3) UL (ref 0.1–1)
MONOCYTES NFR BLD AUTO: 11 %
NEUTROPHILS # BLD AUTO: 3.66 X10 (3) UL (ref 1.5–7.7)
NEUTROPHILS # BLD AUTO: 3.66 X10(3) UL (ref 1.5–7.7)
NEUTROPHILS NFR BLD AUTO: 59 %
OSMOLALITY SERPL CALC.SUM OF ELEC: 286 MOSM/KG (ref 275–295)
PLATELET # BLD AUTO: 264 10(3)UL (ref 150–450)
POTASSIUM SERPL-SCNC: 4.1 MMOL/L (ref 3.5–5.1)
PROT SERPL-MCNC: 6.6 G/DL (ref 6.4–8.2)
PROTHROMBIN TIME: 13.6 SECONDS (ref 11.6–14.8)
RBC # BLD AUTO: 4.31 X10(6)UL
SODIUM SERPL-SCNC: 139 MMOL/L (ref 136–145)
WBC # BLD AUTO: 6.2 X10(3) UL (ref 4–11)

## 2022-01-04 PROCEDURE — 87389 HIV-1 AG W/HIV-1&-2 AB AG IA: CPT

## 2022-01-04 PROCEDURE — 85730 THROMBOPLASTIN TIME PARTIAL: CPT

## 2022-01-04 PROCEDURE — 84702 CHORIONIC GONADOTROPIN TEST: CPT

## 2022-01-04 PROCEDURE — 85025 COMPLETE CBC W/AUTO DIFF WBC: CPT

## 2022-01-04 PROCEDURE — 85610 PROTHROMBIN TIME: CPT

## 2022-01-04 PROCEDURE — 80053 COMPREHEN METABOLIC PANEL: CPT

## 2022-01-04 PROCEDURE — 36415 COLL VENOUS BLD VENIPUNCTURE: CPT

## 2022-01-07 ENCOUNTER — PATIENT MESSAGE (OUTPATIENT)
Dept: FAMILY MEDICINE CLINIC | Facility: CLINIC | Age: 42
End: 2022-01-07

## 2022-01-17 ENCOUNTER — OFFICE VISIT (OUTPATIENT)
Dept: FAMILY MEDICINE CLINIC | Facility: CLINIC | Age: 42
End: 2022-01-17
Payer: COMMERCIAL

## 2022-01-17 VITALS
DIASTOLIC BLOOD PRESSURE: 84 MMHG | BODY MASS INDEX: 26.06 KG/M2 | HEART RATE: 84 BPM | WEIGHT: 166 LBS | RESPIRATION RATE: 20 BRPM | HEIGHT: 67 IN | TEMPERATURE: 98 F | SYSTOLIC BLOOD PRESSURE: 120 MMHG | OXYGEN SATURATION: 98 %

## 2022-01-17 DIAGNOSIS — Z01.818 PREOPERATIVE CLEARANCE: Primary | ICD-10-CM

## 2022-01-17 DIAGNOSIS — E66.3 OVERWEIGHT (BMI 25.0-29.9): ICD-10-CM

## 2022-01-17 PROCEDURE — 3079F DIAST BP 80-89 MM HG: CPT | Performed by: FAMILY MEDICINE

## 2022-01-17 PROCEDURE — 3074F SYST BP LT 130 MM HG: CPT | Performed by: FAMILY MEDICINE

## 2022-01-17 PROCEDURE — 3008F BODY MASS INDEX DOCD: CPT | Performed by: FAMILY MEDICINE

## 2022-01-17 PROCEDURE — 99243 OFF/OP CNSLTJ NEW/EST LOW 30: CPT | Performed by: FAMILY MEDICINE

## 2022-01-17 NOTE — PROGRESS NOTES
Subjective:   Patient ID: Dae Macias is a 46year old female. Pt is here for preop surgery. She is having tummy tuck done with Dr. Zulema Valenzuela on 2/17/22. ROS is negative. Previous surgery 3 years ago no complications.       History/Other:   Review of Behavior: Behavior normal.         Thought Content: Thought content normal.         Judgment: Judgment normal.         Assessment & Plan:   Preoperative clearance  (primary encounter diagnosis)  Overweight (BMI 25.0-29. 9)  Reviewed results.   They are accep

## 2022-04-17 ENCOUNTER — HOSPITAL ENCOUNTER (EMERGENCY)
Facility: HOSPITAL | Age: 42
Discharge: HOME | End: 2022-04-17
Attending: EMERGENCY MEDICINE
Payer: COMMERCIAL

## 2022-04-17 ENCOUNTER — APPOINTMENT (OUTPATIENT)
Dept: GENERAL RADIOLOGY | Facility: HOSPITAL | Age: 42
End: 2022-04-17
Attending: EMERGENCY MEDICINE
Payer: COMMERCIAL

## 2022-04-17 VITALS
TEMPERATURE: 99 F | WEIGHT: 175 LBS | OXYGEN SATURATION: 97 % | SYSTOLIC BLOOD PRESSURE: 165 MMHG | RESPIRATION RATE: 20 BRPM | HEART RATE: 72 BPM | BODY MASS INDEX: 27 KG/M2 | DIASTOLIC BLOOD PRESSURE: 84 MMHG

## 2022-04-17 DIAGNOSIS — R07.9 CHEST PAIN OF UNCERTAIN ETIOLOGY: Primary | ICD-10-CM

## 2022-04-17 LAB
ALBUMIN SERPL-MCNC: 3.9 G/DL (ref 3.4–5)
ALBUMIN/GLOB SERPL: 1.3 {RATIO} (ref 1–2)
ALP LIVER SERPL-CCNC: 59 U/L
ALT SERPL-CCNC: 19 U/L
ANION GAP SERPL CALC-SCNC: 6 MMOL/L (ref 0–18)
AST SERPL-CCNC: 14 U/L (ref 15–37)
BASOPHILS # BLD AUTO: 0.05 X10(3) UL (ref 0–0.2)
BASOPHILS NFR BLD AUTO: 0.7 %
BILIRUB SERPL-MCNC: 0.4 MG/DL (ref 0.1–2)
BUN BLD-MCNC: 11 MG/DL (ref 7–18)
CALCIUM BLD-MCNC: 8.8 MG/DL (ref 8.5–10.1)
CHLORIDE SERPL-SCNC: 108 MMOL/L (ref 98–112)
CO2 SERPL-SCNC: 27 MMOL/L (ref 21–32)
CREAT BLD-MCNC: 0.86 MG/DL
D DIMER PPP FEU-MCNC: <0.27 UG/ML FEU (ref ?–0.5)
EOSINOPHIL # BLD AUTO: 0.36 X10(3) UL (ref 0–0.7)
EOSINOPHIL NFR BLD AUTO: 5.2 %
ERYTHROCYTE [DISTWIDTH] IN BLOOD BY AUTOMATED COUNT: 13.1 %
GLOBULIN PLAS-MCNC: 3.1 G/DL (ref 2.8–4.4)
GLUCOSE BLD-MCNC: 106 MG/DL (ref 70–99)
HCT VFR BLD AUTO: 35.4 %
HGB BLD-MCNC: 10.9 G/DL
IMM GRANULOCYTES # BLD AUTO: 0.02 X10(3) UL (ref 0–1)
IMM GRANULOCYTES NFR BLD: 0.3 %
LYMPHOCYTES # BLD AUTO: 1.91 X10(3) UL (ref 1–4)
LYMPHOCYTES NFR BLD AUTO: 27.4 %
MCH RBC QN AUTO: 25.7 PG (ref 26–34)
MCHC RBC AUTO-ENTMCNC: 30.8 G/DL (ref 31–37)
MCV RBC AUTO: 83.5 FL
MONOCYTES # BLD AUTO: 0.79 X10(3) UL (ref 0.1–1)
MONOCYTES NFR BLD AUTO: 11.3 %
NEUTROPHILS # BLD AUTO: 3.84 X10 (3) UL (ref 1.5–7.7)
NEUTROPHILS # BLD AUTO: 3.84 X10(3) UL (ref 1.5–7.7)
NEUTROPHILS NFR BLD AUTO: 55.1 %
OSMOLALITY SERPL CALC.SUM OF ELEC: 292 MOSM/KG (ref 275–295)
PLATELET # BLD AUTO: 275 10(3)UL (ref 150–450)
POTASSIUM SERPL-SCNC: 4.1 MMOL/L (ref 3.5–5.1)
PROT SERPL-MCNC: 7 G/DL (ref 6.4–8.2)
RBC # BLD AUTO: 4.24 X10(6)UL
SODIUM SERPL-SCNC: 141 MMOL/L (ref 136–145)
TROPONIN I HIGH SENSITIVITY: 3 NG/L
TROPONIN I HIGH SENSITIVITY: 4 NG/L
WBC # BLD AUTO: 7 X10(3) UL (ref 4–11)

## 2022-04-17 PROCEDURE — 71045 X-RAY EXAM CHEST 1 VIEW: CPT | Performed by: EMERGENCY MEDICINE

## 2022-04-17 PROCEDURE — 93010 ELECTROCARDIOGRAM REPORT: CPT

## 2022-04-17 PROCEDURE — 84484 ASSAY OF TROPONIN QUANT: CPT | Performed by: EMERGENCY MEDICINE

## 2022-04-17 PROCEDURE — 93005 ELECTROCARDIOGRAM TRACING: CPT

## 2022-04-17 PROCEDURE — 99285 EMERGENCY DEPT VISIT HI MDM: CPT

## 2022-04-17 PROCEDURE — 85025 COMPLETE CBC W/AUTO DIFF WBC: CPT | Performed by: EMERGENCY MEDICINE

## 2022-04-17 PROCEDURE — 85379 FIBRIN DEGRADATION QUANT: CPT | Performed by: EMERGENCY MEDICINE

## 2022-04-17 PROCEDURE — 99284 EMERGENCY DEPT VISIT MOD MDM: CPT

## 2022-04-17 PROCEDURE — 96374 THER/PROPH/DIAG INJ IV PUSH: CPT

## 2022-04-17 PROCEDURE — 80053 COMPREHEN METABOLIC PANEL: CPT | Performed by: EMERGENCY MEDICINE

## 2022-04-17 RX ORDER — ASPIRIN 81 MG/1
324 TABLET, CHEWABLE ORAL ONCE
Status: COMPLETED | OUTPATIENT
Start: 2022-04-17 | End: 2022-04-17

## 2022-04-17 RX ORDER — KETOROLAC TROMETHAMINE 30 MG/ML
15 INJECTION, SOLUTION INTRAMUSCULAR; INTRAVENOUS ONCE
Status: COMPLETED | OUTPATIENT
Start: 2022-04-17 | End: 2022-04-17

## 2022-04-18 LAB
ATRIAL RATE: 83 BPM
P AXIS: 57 DEGREES
P-R INTERVAL: 156 MS
Q-T INTERVAL: 354 MS
QRS DURATION: 66 MS
QTC CALCULATION (BEZET): 415 MS
R AXIS: 56 DEGREES
T AXIS: 57 DEGREES
VENTRICULAR RATE: 83 BPM

## 2022-06-16 ENCOUNTER — TELEPHONE (OUTPATIENT)
Dept: OBGYN CLINIC | Facility: CLINIC | Age: 42
End: 2022-06-16

## 2022-06-16 DIAGNOSIS — N76.0 BACTERIAL VAGINOSIS: Primary | ICD-10-CM

## 2022-06-16 DIAGNOSIS — B96.89 BACTERIAL VAGINOSIS: Primary | ICD-10-CM

## 2022-06-16 RX ORDER — METRONIDAZOLE 500 MG/1
500 TABLET ORAL 2 TIMES DAILY
Qty: 14 TABLET | Refills: 0 | Status: SHIPPED | OUTPATIENT
Start: 2022-06-16

## 2022-06-16 NOTE — TELEPHONE ENCOUNTER
Contacted patient and states she has noticed white discharge and foul \"fishy\" odor. Symptoms consistent with bacterial vaginosis, per protocol, Flagyl 500 mg po bid x7 days, #14 no refills sent to pharmacy on file. Allergies verified and pharmacy location verified before sending rx. Instructed patient to call with any new or worsening symptoms, or symptoms that persist beyond 1 week after her last dose of medication. Also instructed patient to avoid alcohol intake while taking medication and for 24 hours after last dose. Patient states understanding and has no further questions/concerns at this time.

## 2022-06-30 ENCOUNTER — OFFICE VISIT (OUTPATIENT)
Dept: FAMILY MEDICINE CLINIC | Facility: CLINIC | Age: 42
End: 2022-06-30
Payer: COMMERCIAL

## 2022-06-30 ENCOUNTER — LAB ENCOUNTER (OUTPATIENT)
Dept: LAB | Age: 42
End: 2022-06-30
Attending: FAMILY MEDICINE
Payer: COMMERCIAL

## 2022-06-30 VITALS
BODY MASS INDEX: 25.11 KG/M2 | DIASTOLIC BLOOD PRESSURE: 72 MMHG | HEIGHT: 67 IN | OXYGEN SATURATION: 98 % | SYSTOLIC BLOOD PRESSURE: 120 MMHG | RESPIRATION RATE: 20 BRPM | HEART RATE: 76 BPM | TEMPERATURE: 98 F | WEIGHT: 160 LBS

## 2022-06-30 DIAGNOSIS — Z00.00 ANNUAL PHYSICAL EXAM: Primary | ICD-10-CM

## 2022-06-30 DIAGNOSIS — Z12.31 ENCOUNTER FOR SCREENING MAMMOGRAM FOR MALIGNANT NEOPLASM OF BREAST: ICD-10-CM

## 2022-06-30 DIAGNOSIS — Z00.00 ANNUAL PHYSICAL EXAM: ICD-10-CM

## 2022-06-30 LAB
ALBUMIN SERPL-MCNC: 3.9 G/DL (ref 3.4–5)
ALBUMIN/GLOB SERPL: 1.3 {RATIO} (ref 1–2)
ALP LIVER SERPL-CCNC: 55 U/L
ALT SERPL-CCNC: 12 U/L
ANION GAP SERPL CALC-SCNC: 5 MMOL/L (ref 0–18)
AST SERPL-CCNC: 13 U/L (ref 15–37)
BASOPHILS # BLD AUTO: 0.02 X10(3) UL (ref 0–0.2)
BASOPHILS NFR BLD AUTO: 0.4 %
BILIRUB SERPL-MCNC: 0.5 MG/DL (ref 0.1–2)
BUN BLD-MCNC: 7 MG/DL (ref 7–18)
CALCIUM BLD-MCNC: 8.6 MG/DL (ref 8.5–10.1)
CHLORIDE SERPL-SCNC: 106 MMOL/L (ref 98–112)
CHOLEST SERPL-MCNC: 141 MG/DL (ref ?–200)
CO2 SERPL-SCNC: 29 MMOL/L (ref 21–32)
CREAT BLD-MCNC: 0.75 MG/DL
EOSINOPHIL # BLD AUTO: 0.2 X10(3) UL (ref 0–0.7)
EOSINOPHIL NFR BLD AUTO: 4.5 %
ERYTHROCYTE [DISTWIDTH] IN BLOOD BY AUTOMATED COUNT: 17.4 %
FASTING PATIENT LIPID ANSWER: YES
FASTING STATUS PATIENT QL REPORTED: YES
GLOBULIN PLAS-MCNC: 3 G/DL (ref 2.8–4.4)
GLUCOSE BLD-MCNC: 98 MG/DL (ref 70–99)
HCT VFR BLD AUTO: 37.3 %
HDLC SERPL-MCNC: 62 MG/DL (ref 40–59)
HGB BLD-MCNC: 11 G/DL
IMM GRANULOCYTES # BLD AUTO: 0.01 X10(3) UL (ref 0–1)
IMM GRANULOCYTES NFR BLD: 0.2 %
LDLC SERPL CALC-MCNC: 62 MG/DL (ref ?–100)
LYMPHOCYTES # BLD AUTO: 0.66 X10(3) UL (ref 1–4)
LYMPHOCYTES NFR BLD AUTO: 14.7 %
MCH RBC QN AUTO: 24.7 PG (ref 26–34)
MCHC RBC AUTO-ENTMCNC: 29.5 G/DL (ref 31–37)
MCV RBC AUTO: 83.8 FL
MONOCYTES # BLD AUTO: 0.77 X10(3) UL (ref 0.1–1)
MONOCYTES NFR BLD AUTO: 17.1 %
NEUTROPHILS # BLD AUTO: 2.83 X10 (3) UL (ref 1.5–7.7)
NEUTROPHILS # BLD AUTO: 2.83 X10(3) UL (ref 1.5–7.7)
NEUTROPHILS NFR BLD AUTO: 63.1 %
NONHDLC SERPL-MCNC: 79 MG/DL (ref ?–130)
OSMOLALITY SERPL CALC.SUM OF ELEC: 288 MOSM/KG (ref 275–295)
PLATELET # BLD AUTO: 282 10(3)UL (ref 150–450)
POTASSIUM SERPL-SCNC: 4 MMOL/L (ref 3.5–5.1)
PROT SERPL-MCNC: 6.9 G/DL (ref 6.4–8.2)
RBC # BLD AUTO: 4.45 X10(6)UL
SODIUM SERPL-SCNC: 140 MMOL/L (ref 136–145)
TRIGL SERPL-MCNC: 94 MG/DL (ref 30–149)
TSI SER-ACNC: 1.25 MIU/ML (ref 0.36–3.74)
VIT D+METAB SERPL-MCNC: 24.7 NG/ML (ref 30–100)
VLDLC SERPL CALC-MCNC: 14 MG/DL (ref 0–30)
WBC # BLD AUTO: 4.5 X10(3) UL (ref 4–11)

## 2022-06-30 PROCEDURE — 36415 COLL VENOUS BLD VENIPUNCTURE: CPT

## 2022-06-30 PROCEDURE — 84443 ASSAY THYROID STIM HORMONE: CPT

## 2022-06-30 PROCEDURE — 80053 COMPREHEN METABOLIC PANEL: CPT

## 2022-06-30 PROCEDURE — 82306 VITAMIN D 25 HYDROXY: CPT

## 2022-06-30 PROCEDURE — 85025 COMPLETE CBC W/AUTO DIFF WBC: CPT

## 2022-06-30 PROCEDURE — 3008F BODY MASS INDEX DOCD: CPT | Performed by: FAMILY MEDICINE

## 2022-06-30 PROCEDURE — 3078F DIAST BP <80 MM HG: CPT | Performed by: FAMILY MEDICINE

## 2022-06-30 PROCEDURE — 99396 PREV VISIT EST AGE 40-64: CPT | Performed by: FAMILY MEDICINE

## 2022-06-30 PROCEDURE — 3074F SYST BP LT 130 MM HG: CPT | Performed by: FAMILY MEDICINE

## 2022-06-30 PROCEDURE — 80061 LIPID PANEL: CPT

## 2022-07-09 ENCOUNTER — HOSPITAL ENCOUNTER (OUTPATIENT)
Age: 42
Discharge: HOME | End: 2022-07-09
Attending: EMERGENCY MEDICINE
Payer: COMMERCIAL

## 2022-07-09 VITALS
TEMPERATURE: 98 F | DIASTOLIC BLOOD PRESSURE: 82 MMHG | HEIGHT: 67 IN | RESPIRATION RATE: 18 BRPM | SYSTOLIC BLOOD PRESSURE: 140 MMHG | HEART RATE: 85 BPM | WEIGHT: 160 LBS | BODY MASS INDEX: 25.11 KG/M2 | OXYGEN SATURATION: 98 %

## 2022-07-09 DIAGNOSIS — U07.1 COVID-19: Primary | ICD-10-CM

## 2022-07-09 LAB
S PYO AG THROAT QL: NEGATIVE
SARS-COV-2 RNA RESP QL NAA+PROBE: DETECTED

## 2022-07-09 PROCEDURE — 99213 OFFICE O/P EST LOW 20 MIN: CPT

## 2022-07-09 PROCEDURE — 87880 STREP A ASSAY W/OPTIC: CPT

## 2022-07-09 RX ORDER — NIRMATRELVIR AND RITONAVIR 300-100 MG
KIT ORAL
Qty: 30 TABLET | Refills: 0 | Status: SHIPPED | OUTPATIENT
Start: 2022-07-09 | End: 2022-07-14

## 2022-07-09 RX ORDER — ALBUTEROL SULFATE 90 UG/1
2 AEROSOL, METERED RESPIRATORY (INHALATION) EVERY 4 HOURS PRN
Qty: 1 EACH | Refills: 0 | Status: SHIPPED | OUTPATIENT
Start: 2022-07-09 | End: 2022-08-08

## 2022-07-13 ENCOUNTER — TELEPHONE (OUTPATIENT)
Dept: FAMILY MEDICINE CLINIC | Facility: CLINIC | Age: 42
End: 2022-07-13

## 2022-07-13 NOTE — TELEPHONE ENCOUNTER
Discussed w/pt. She want to UC on 7/9 and was given albuterol inhaler and paxlovid. She is taking both along with nasal spray, but said she can't stand cough any longer. Informed her she can try Robitussin OTC and then scheduled with SUDARSHAN for VV tomorrow to see if SUDARSHAN will Rx something additional.     FYI to [address] for upcoming VV.

## 2022-07-14 ENCOUNTER — TELEMEDICINE (OUTPATIENT)
Dept: FAMILY MEDICINE CLINIC | Facility: CLINIC | Age: 42
End: 2022-07-14
Payer: COMMERCIAL

## 2022-07-14 DIAGNOSIS — R05.9 COUGH: ICD-10-CM

## 2022-07-14 DIAGNOSIS — U07.1 COVID-19: Primary | ICD-10-CM

## 2022-07-14 PROCEDURE — 99213 OFFICE O/P EST LOW 20 MIN: CPT | Performed by: INTERNAL MEDICINE

## 2022-07-14 RX ORDER — BENZONATATE 200 MG/1
200 CAPSULE ORAL 3 TIMES DAILY PRN
Qty: 30 CAPSULE | Refills: 0 | Status: SHIPPED | OUTPATIENT
Start: 2022-07-14

## 2022-07-14 RX ORDER — PREDNISONE 20 MG/1
TABLET ORAL
Qty: 8 TABLET | Refills: 0 | Status: SHIPPED | OUTPATIENT
Start: 2022-07-14

## 2022-11-07 PROBLEM — N87.1 DYSPLASIA OF CERVIX, HIGH GRADE CIN 2: Status: ACTIVE | Noted: 2019-07-18

## 2022-11-07 PROBLEM — Z91.89 AT HIGH RISK FOR BREAST CANCER: Status: ACTIVE | Noted: 2022-11-07

## 2022-11-08 ENCOUNTER — OFFICE VISIT (OUTPATIENT)
Dept: OBGYN CLINIC | Facility: CLINIC | Age: 42
End: 2022-11-08
Payer: COMMERCIAL

## 2022-11-08 VITALS
HEART RATE: 79 BPM | HEIGHT: 67 IN | DIASTOLIC BLOOD PRESSURE: 90 MMHG | WEIGHT: 159.19 LBS | SYSTOLIC BLOOD PRESSURE: 128 MMHG | BODY MASS INDEX: 24.99 KG/M2

## 2022-11-08 DIAGNOSIS — Z01.419 ENCOUNTER FOR GYNECOLOGICAL EXAMINATION WITHOUT ABNORMAL FINDING: Primary | ICD-10-CM

## 2022-11-08 DIAGNOSIS — Z11.3 SCREEN FOR STD (SEXUALLY TRANSMITTED DISEASE): ICD-10-CM

## 2022-11-08 DIAGNOSIS — Z12.4 SCREENING FOR CERVICAL CANCER: ICD-10-CM

## 2022-11-08 PROCEDURE — 87491 CHLMYD TRACH DNA AMP PROBE: CPT | Performed by: OBSTETRICS & GYNECOLOGY

## 2022-11-08 PROCEDURE — 87591 N.GONORRHOEAE DNA AMP PROB: CPT | Performed by: OBSTETRICS & GYNECOLOGY

## 2022-11-08 PROCEDURE — 99396 PREV VISIT EST AGE 40-64: CPT | Performed by: OBSTETRICS & GYNECOLOGY

## 2022-11-08 PROCEDURE — 87624 HPV HI-RISK TYP POOLED RSLT: CPT | Performed by: OBSTETRICS & GYNECOLOGY

## 2022-11-08 PROCEDURE — 3074F SYST BP LT 130 MM HG: CPT | Performed by: OBSTETRICS & GYNECOLOGY

## 2022-11-08 PROCEDURE — 3080F DIAST BP >= 90 MM HG: CPT | Performed by: OBSTETRICS & GYNECOLOGY

## 2022-11-08 PROCEDURE — 3008F BODY MASS INDEX DOCD: CPT | Performed by: OBSTETRICS & GYNECOLOGY

## 2022-11-08 NOTE — PROGRESS NOTES
Subjective:  No chief complaint on file. 38year old female who presents for annual well woman visit without complaints. Considering IVF due to new partner and hx of tubal ligation. Had seen Dr. Josefa Spatz in 2019 and had initial workup but never started IVF. Patient's last menstrual period was 10/14/2022. Hx Prior Abnormal Pap: Yes (12/20/19 [facility] GYN INTERPRETATION: Atypical squamous cells of undetermined significance (ASC-US) Important)  Pap Date: 08/21/21  Pap Result Notes: wnl  Menarche: 15 (11/8/2022 11:49 AM)  Period Cycle (Days): 21-25 days (11/8/2022 11:49 AM)  Period Duration (Days): 7 days (11/8/2022 11:49 AM)  Period Flow: heavy (11/8/2022 11:49 AM)  Use of Birth Control (if yes, specify type): Tubal Ligation (11/8/2022 11:49 AM)  Date When Birth Control Last Used: 2017 ocp (11/8/2022 11:49 AM)  Hx Prior Abnormal Pap: Yes (12/20/19 [facility] GYN INTERPRETATION: Atypical squamous cells of undetermined significance (ASC-US) Important) (11/8/2022 11:49 AM)  Pap Date: 08/21/21 (11/8/2022 11:49 AM)  Pap Result Notes: wnl (11/8/2022 11:49 AM)      Last Mammo:  2018. Ordered by PCP  Last Pap smear: 8/2021     Abnormal Pap: hx of LILY 2 2019 not treated. Pelvic Infections/STD: None  Contraception: tubal    Review of Systems:  Pertinent items are noted in the HPI. Patient History:  New Medical Illness: None  New Surgeries: None  New Family History: None   reports that she has quit smoking. Her smoking use included cigarettes. She has never used smokeless tobacco.   reports current alcohol use.     Most Recent Immunizations  Administered            Date(s) Administered    Influenza             10/19/2013      TDAP                  10/10/2018    Deferred                Date(s) Deferred    Influenza             02/27/2015      Objective:  /90   Pulse 79   Ht 67\"   Wt 159 lb 3.2 oz (72.2 kg)   LMP 10/14/2022   Physical Examination:  General appearance: Well dressed, well nourished in no apparent distress  Neurologic/Psychiatric: Alert and oriented to person, place and time, mood normal, affect appropriate  Head: Normocephalic without obvious deformity, atraumatic  Neck: No thyromegaly, supple, non-tender, no masses, no adenopathy  Lungs: Clear to auscultation bilaterally, no rales, wheezes or rhonchi  Breasts: Symmetric, non-tender, no masses, lesions, retraction, dimpling or discharge bilaterally, no axillary or supraclavicular lymphadenopathy  Heart[de-identified] Regular rate and rhythm, no gallops or murmurs  Abdomen: Soft, non-tender, non-distended, no masses, no hepatosplenomegaly, no hernias, no inguinal lymphadenopathy  Pelvic:    External genitalia- Normal, Bartholin's, urethra, skeins glands normal   Vagina- No vaginal lesions, no discharge   Cervix- No lesions, long/closed, no cervical motion tenderness   Uterus- Normal sized, anteverted, non-tender, no masses   Adnexa-  Non-tender, no masses  Extremities: Non-tender, full range of motion, no clubbing, cyanosis or edema  Skin:  General inspection- no rashes, lesions or discoloration  Rectum: No hemorrhoids, no masses. HPV/Pap smear obtained    Assessment/Plan:  Normal well-woman exam.  Yearly mammogram- discussed screening at higher risk for breast cancer. Offered referral high risk breast clinic. Will call if desires. Encouraged regular screening  Hx of tubal ligation- patient may contact Dr. Erika Munoz. STD screen obtained. Hx of LILY 2 2019- HPV/Pap smear obtained. Patient offered chaperone for exam, declined    Diagnoses and all orders for this visit:    Encounter for gynecological examination without abnormal finding    Screening for cervical cancer  -     THINPREP PAP SMEAR B; Future  -     HPV HIGH RISK , THIN PREP COLLECTION; Future    Screen for STD (sexually transmitted disease)  -     CHLAMYDIA/GONOCOCCUS, KRISTIAN; Future      Return in about 1 year (around 11/8/2023) for Annual Gyn Visit.

## 2022-11-09 LAB
C TRACH DNA SPEC QL NAA+PROBE: NEGATIVE
HPV I/H RISK 1 DNA SPEC QL NAA+PROBE: NEGATIVE
N GONORRHOEA DNA SPEC QL NAA+PROBE: NEGATIVE

## 2022-11-18 ENCOUNTER — HOSPITAL ENCOUNTER (OUTPATIENT)
Age: 42
Discharge: HOME | End: 2022-11-18
Attending: EMERGENCY MEDICINE
Payer: COMMERCIAL

## 2022-11-18 ENCOUNTER — APPOINTMENT (OUTPATIENT)
Dept: GENERAL RADIOLOGY | Age: 42
End: 2022-11-18
Attending: EMERGENCY MEDICINE
Payer: COMMERCIAL

## 2022-11-18 VITALS
OXYGEN SATURATION: 100 % | WEIGHT: 155 LBS | BODY MASS INDEX: 24.33 KG/M2 | DIASTOLIC BLOOD PRESSURE: 84 MMHG | HEART RATE: 62 BPM | HEIGHT: 67 IN | SYSTOLIC BLOOD PRESSURE: 129 MMHG | TEMPERATURE: 98 F | RESPIRATION RATE: 16 BRPM

## 2022-11-18 DIAGNOSIS — R00.2 PALPITATIONS: Primary | ICD-10-CM

## 2022-11-18 DIAGNOSIS — F41.0 PANIC ATTACK: ICD-10-CM

## 2022-11-18 LAB
BUN BLD-MCNC: 5 MG/DL (ref 7–18)
CHLORIDE BLD-SCNC: 101 MMOL/L (ref 98–112)
CO2 BLD-SCNC: 26 MMOL/L (ref 21–32)
CREAT BLD-MCNC: 0.6 MG/DL
GFR SERPLBLD BASED ON 1.73 SQ M-ARVRAT: 116 ML/MIN/1.73M2 (ref 60–?)
GLUCOSE BLD-MCNC: 104 MG/DL (ref 70–99)
HCT VFR BLD CALC: 37 %
ISTAT IONIZED CALCIUM FOR CHEM 8: 1.17 MMOL/L (ref 1.12–1.32)
POTASSIUM BLD-SCNC: 3.6 MMOL/L (ref 3.6–5.1)
SODIUM BLD-SCNC: 139 MMOL/L (ref 136–145)
T4 FREE SERPL-MCNC: 1.2 NG/DL (ref 0.8–1.7)
TROPONIN I BLD-MCNC: <0.02 NG/ML
TSI SER-ACNC: 0.98 MIU/ML (ref 0.36–3.74)

## 2022-11-18 PROCEDURE — 84439 ASSAY OF FREE THYROXINE: CPT | Performed by: EMERGENCY MEDICINE

## 2022-11-18 PROCEDURE — 36415 COLL VENOUS BLD VENIPUNCTURE: CPT

## 2022-11-18 PROCEDURE — 84484 ASSAY OF TROPONIN QUANT: CPT

## 2022-11-18 PROCEDURE — 80047 BASIC METABLC PNL IONIZED CA: CPT

## 2022-11-18 PROCEDURE — 93010 ELECTROCARDIOGRAM REPORT: CPT

## 2022-11-18 PROCEDURE — 99214 OFFICE O/P EST MOD 30 MIN: CPT

## 2022-11-18 PROCEDURE — 99215 OFFICE O/P EST HI 40 MIN: CPT

## 2022-11-18 PROCEDURE — 84443 ASSAY THYROID STIM HORMONE: CPT | Performed by: EMERGENCY MEDICINE

## 2022-11-18 PROCEDURE — 71046 X-RAY EXAM CHEST 2 VIEWS: CPT | Performed by: EMERGENCY MEDICINE

## 2022-11-18 PROCEDURE — 93005 ELECTROCARDIOGRAM TRACING: CPT

## 2022-11-18 RX ORDER — HYDROXYZINE HYDROCHLORIDE 25 MG/1
TABLET, FILM COATED ORAL EVERY 4 HOURS PRN
Qty: 20 TABLET | Refills: 0 | Status: SHIPPED | OUTPATIENT
Start: 2022-11-18 | End: 2022-12-18

## 2022-11-19 LAB
ATRIAL RATE: 75 BPM
P AXIS: 52 DEGREES
P-R INTERVAL: 172 MS
Q-T INTERVAL: 382 MS
QRS DURATION: 70 MS
QTC CALCULATION (BEZET): 426 MS
R AXIS: 30 DEGREES
T AXIS: 56 DEGREES
VENTRICULAR RATE: 75 BPM

## 2022-11-19 NOTE — ED INITIAL ASSESSMENT (HPI)
Chest pian - started 2 days ago  About 3 mins x 5 x per day. Numbness - on fingers x 2 days   Rapid heart rate- x 2 days , when it happens feels something stuck in her throat.

## 2022-11-19 NOTE — DISCHARGE INSTRUCTIONS
Take the Atarax as needed for anxiety this may make you drowsy. Do not drink or drive while taking. Avoid excessive caffeine drink plenty fluids follow-up with your primary care doctor next week return for worsening symptoms.

## 2022-12-08 ENCOUNTER — TELEPHONE (OUTPATIENT)
Dept: OBGYN CLINIC | Facility: CLINIC | Age: 42
End: 2022-12-08

## 2022-12-08 RX ORDER — METRONIDAZOLE 500 MG/1
500 TABLET ORAL 2 TIMES DAILY
Qty: 14 TABLET | Refills: 0 | Status: SHIPPED | OUTPATIENT
Start: 2022-12-08 | End: 2022-12-15

## 2022-12-08 NOTE — TELEPHONE ENCOUNTER
Odor started before her period and had continued now that period is done. Patient notices a slight discharge. Advised patient she meets criteria to be treated for a bacterial infection. Let patient know that we will prescribe flagyl 500mg bid x 7 days. Patient instructed not to use alcohol while on med and for 24 hours after stopping med. Pharmacy and allergies verified. rx sent in to patient's pharmacy.

## 2023-08-11 ENCOUNTER — LAB ENCOUNTER (OUTPATIENT)
Dept: LAB | Age: 43
End: 2023-08-11
Attending: FAMILY MEDICINE
Payer: COMMERCIAL

## 2023-08-11 ENCOUNTER — OFFICE VISIT (OUTPATIENT)
Dept: FAMILY MEDICINE CLINIC | Facility: CLINIC | Age: 43
End: 2023-08-11
Payer: COMMERCIAL

## 2023-08-11 VITALS
WEIGHT: 161 LBS | HEIGHT: 67 IN | HEART RATE: 64 BPM | BODY MASS INDEX: 25.27 KG/M2 | RESPIRATION RATE: 18 BRPM | OXYGEN SATURATION: 99 % | SYSTOLIC BLOOD PRESSURE: 110 MMHG | DIASTOLIC BLOOD PRESSURE: 70 MMHG

## 2023-08-11 DIAGNOSIS — Z98.82 HISTORY OF BILATERAL BREAST IMPLANTS: ICD-10-CM

## 2023-08-11 DIAGNOSIS — Z12.31 ENCOUNTER FOR SCREENING MAMMOGRAM FOR MALIGNANT NEOPLASM OF BREAST: ICD-10-CM

## 2023-08-11 DIAGNOSIS — F41.9 ANXIETY: ICD-10-CM

## 2023-08-11 DIAGNOSIS — K58.1 IRRITABLE BOWEL SYNDROME WITH CONSTIPATION: ICD-10-CM

## 2023-08-11 DIAGNOSIS — Z00.00 ANNUAL PHYSICAL EXAM: ICD-10-CM

## 2023-08-11 DIAGNOSIS — Z00.00 ANNUAL PHYSICAL EXAM: Primary | ICD-10-CM

## 2023-08-11 LAB
ALBUMIN SERPL-MCNC: 3.9 G/DL (ref 3.4–5)
ALBUMIN/GLOB SERPL: 1.2 {RATIO} (ref 1–2)
ALP LIVER SERPL-CCNC: 76 U/L
ALT SERPL-CCNC: 26 U/L
ANION GAP SERPL CALC-SCNC: 5 MMOL/L (ref 0–18)
AST SERPL-CCNC: 22 U/L (ref 15–37)
BASOPHILS # BLD AUTO: 0.04 X10(3) UL (ref 0–0.2)
BASOPHILS NFR BLD AUTO: 0.8 %
BILIRUB SERPL-MCNC: 0.4 MG/DL (ref 0.1–2)
BUN BLD-MCNC: 7 MG/DL (ref 7–18)
CALCIUM BLD-MCNC: 8 MG/DL (ref 8.5–10.1)
CHLORIDE SERPL-SCNC: 111 MMOL/L (ref 98–112)
CHOLEST SERPL-MCNC: 117 MG/DL (ref ?–200)
CO2 SERPL-SCNC: 25 MMOL/L (ref 21–32)
CREAT BLD-MCNC: 0.71 MG/DL
EGFRCR SERPLBLD CKD-EPI 2021: 109 ML/MIN/1.73M2 (ref 60–?)
EOSINOPHIL # BLD AUTO: 0.33 X10(3) UL (ref 0–0.7)
EOSINOPHIL NFR BLD AUTO: 6.4 %
ERYTHROCYTE [DISTWIDTH] IN BLOOD BY AUTOMATED COUNT: 12.7 %
FASTING PATIENT LIPID ANSWER: YES
FASTING STATUS PATIENT QL REPORTED: YES
GLOBULIN PLAS-MCNC: 3.2 G/DL (ref 2.8–4.4)
GLUCOSE BLD-MCNC: 92 MG/DL (ref 70–99)
HCT VFR BLD AUTO: 39.6 %
HDLC SERPL-MCNC: 47 MG/DL (ref 40–59)
HGB BLD-MCNC: 12.4 G/DL
IGA SERPL-MCNC: 179 MG/DL (ref 70–312)
IMM GRANULOCYTES # BLD AUTO: 0.01 X10(3) UL (ref 0–1)
IMM GRANULOCYTES NFR BLD: 0.2 %
LDLC SERPL CALC-MCNC: 54 MG/DL (ref ?–100)
LYMPHOCYTES # BLD AUTO: 1.12 X10(3) UL (ref 1–4)
LYMPHOCYTES NFR BLD AUTO: 21.6 %
MCH RBC QN AUTO: 28.2 PG (ref 26–34)
MCHC RBC AUTO-ENTMCNC: 31.3 G/DL (ref 31–37)
MCV RBC AUTO: 90 FL
MONOCYTES # BLD AUTO: 0.65 X10(3) UL (ref 0.1–1)
MONOCYTES NFR BLD AUTO: 12.5 %
NEUTROPHILS # BLD AUTO: 3.03 X10 (3) UL (ref 1.5–7.7)
NEUTROPHILS # BLD AUTO: 3.03 X10(3) UL (ref 1.5–7.7)
NEUTROPHILS NFR BLD AUTO: 58.5 %
NONHDLC SERPL-MCNC: 70 MG/DL (ref ?–130)
OSMOLALITY SERPL CALC.SUM OF ELEC: 290 MOSM/KG (ref 275–295)
PLATELET # BLD AUTO: 266 10(3)UL (ref 150–450)
POTASSIUM SERPL-SCNC: 4.2 MMOL/L (ref 3.5–5.1)
PROT SERPL-MCNC: 7.1 G/DL (ref 6.4–8.2)
RBC # BLD AUTO: 4.4 X10(6)UL
SODIUM SERPL-SCNC: 141 MMOL/L (ref 136–145)
TRIGL SERPL-MCNC: 80 MG/DL (ref 30–149)
TSI SER-ACNC: 0.73 MIU/ML (ref 0.36–3.74)
VLDLC SERPL CALC-MCNC: 12 MG/DL (ref 0–30)
WBC # BLD AUTO: 5.2 X10(3) UL (ref 4–11)

## 2023-08-11 PROCEDURE — 82784 ASSAY IGA/IGD/IGG/IGM EACH: CPT

## 2023-08-11 PROCEDURE — 36415 COLL VENOUS BLD VENIPUNCTURE: CPT

## 2023-08-11 PROCEDURE — 86003 ALLG SPEC IGE CRUDE XTRC EA: CPT

## 2023-08-11 PROCEDURE — 86364 TISS TRNSGLTMNASE EA IG CLAS: CPT

## 2023-08-11 PROCEDURE — 80053 COMPREHEN METABOLIC PANEL: CPT

## 2023-08-11 PROCEDURE — 85025 COMPLETE CBC W/AUTO DIFF WBC: CPT

## 2023-08-11 PROCEDURE — 80061 LIPID PANEL: CPT

## 2023-08-11 PROCEDURE — 84443 ASSAY THYROID STIM HORMONE: CPT

## 2023-08-14 LAB
GLUTEN IGE QN: <0.1 KUA/L (ref ?–0.1)
TTG IGA SER-ACNC: 0.4 U/ML (ref ?–7)

## 2023-08-29 NOTE — TELEPHONE ENCOUNTER
Informed patient that medical records was unable to locate pathology. Operative report faxed to Dr. Shy Chandler office @ [phone]. No further questions or concerns. · See above

## 2023-11-10 ENCOUNTER — LAB ENCOUNTER (OUTPATIENT)
Dept: LAB | Age: 43
End: 2023-11-10
Attending: FAMILY MEDICINE
Payer: COMMERCIAL

## 2023-11-10 DIAGNOSIS — E83.51 HYPOCALCEMIA: ICD-10-CM

## 2023-11-10 LAB
ALBUMIN SERPL-MCNC: 4.2 G/DL (ref 3.4–5)
ALBUMIN/GLOB SERPL: 1.3 {RATIO} (ref 1–2)
ALP LIVER SERPL-CCNC: 57 U/L
ALT SERPL-CCNC: 16 U/L
ANION GAP SERPL CALC-SCNC: 4 MMOL/L (ref 0–18)
AST SERPL-CCNC: 13 U/L (ref 15–37)
BILIRUB SERPL-MCNC: 0.8 MG/DL (ref 0.1–2)
BUN BLD-MCNC: 13 MG/DL (ref 9–23)
CALCIUM BLD-MCNC: 8.6 MG/DL (ref 8.5–10.1)
CHLORIDE SERPL-SCNC: 106 MMOL/L (ref 98–112)
CO2 SERPL-SCNC: 28 MMOL/L (ref 21–32)
CREAT BLD-MCNC: 0.83 MG/DL
EGFRCR SERPLBLD CKD-EPI 2021: 91 ML/MIN/1.73M2 (ref 60–?)
FASTING STATUS PATIENT QL REPORTED: NO
GLOBULIN PLAS-MCNC: 3.2 G/DL (ref 2.8–4.4)
GLUCOSE BLD-MCNC: 84 MG/DL (ref 70–99)
OSMOLALITY SERPL CALC.SUM OF ELEC: 285 MOSM/KG (ref 275–295)
POTASSIUM SERPL-SCNC: 3.8 MMOL/L (ref 3.5–5.1)
PROT SERPL-MCNC: 7.4 G/DL (ref 6.4–8.2)
SODIUM SERPL-SCNC: 138 MMOL/L (ref 136–145)

## 2023-11-10 PROCEDURE — 80053 COMPREHEN METABOLIC PANEL: CPT

## 2023-11-10 PROCEDURE — 36415 COLL VENOUS BLD VENIPUNCTURE: CPT

## 2023-11-16 PROBLEM — N97.9 FEMALE INFERTILITY: Status: ACTIVE | Noted: 2023-11-16

## 2023-11-17 ENCOUNTER — OFFICE VISIT (OUTPATIENT)
Dept: OBGYN CLINIC | Facility: CLINIC | Age: 43
End: 2023-11-17
Payer: COMMERCIAL

## 2023-11-17 VITALS
SYSTOLIC BLOOD PRESSURE: 132 MMHG | DIASTOLIC BLOOD PRESSURE: 72 MMHG | WEIGHT: 159.19 LBS | BODY MASS INDEX: 25 KG/M2 | HEART RATE: 86 BPM

## 2023-11-17 DIAGNOSIS — N92.0 MENORRHAGIA WITH REGULAR CYCLE: ICD-10-CM

## 2023-11-17 DIAGNOSIS — Z12.31 BREAST CANCER SCREENING BY MAMMOGRAM: ICD-10-CM

## 2023-11-17 DIAGNOSIS — Z12.4 SCREENING FOR CERVICAL CANCER: ICD-10-CM

## 2023-11-17 DIAGNOSIS — Z01.419 ENCOUNTER FOR GYNECOLOGICAL EXAMINATION WITHOUT ABNORMAL FINDING: Primary | ICD-10-CM

## 2023-11-17 PROCEDURE — 3075F SYST BP GE 130 - 139MM HG: CPT | Performed by: OBSTETRICS & GYNECOLOGY

## 2023-11-17 PROCEDURE — 88175 CYTOPATH C/V AUTO FLUID REDO: CPT | Performed by: OBSTETRICS & GYNECOLOGY

## 2023-11-17 PROCEDURE — 87624 HPV HI-RISK TYP POOLED RSLT: CPT | Performed by: OBSTETRICS & GYNECOLOGY

## 2023-11-17 PROCEDURE — 3078F DIAST BP <80 MM HG: CPT | Performed by: OBSTETRICS & GYNECOLOGY

## 2023-11-17 PROCEDURE — 99396 PREV VISIT EST AGE 40-64: CPT | Performed by: OBSTETRICS & GYNECOLOGY

## 2023-11-17 RX ORDER — TRANEXAMIC ACID 650 MG/1
1300 TABLET ORAL EVERY 8 HOURS
Qty: 90 TABLET | Refills: 2 | Status: SHIPPED | OUTPATIENT
Start: 2023-11-17

## 2023-11-17 NOTE — PROGRESS NOTES
Subjective:  Chief Complaint   Patient presents with    Physical     Annual exam     41year old female who presents for annual well woman visit, complaining of heavy painful menses. Monthly, duration 7 days, heavy for 4 days with bad cramps. Menses have always been heavy. No longer pursuing infertility treatment. Has order for breast MRI from PCP. Reluctant to do yearly mammography due to breast implants and concerns about affecting them. Patient's last menstrual period was 11/01/2023 (exact date). Hx Prior Abnormal Pap: Yes  Pap Date: 11/08/22  Pap Result Notes: WNL  Menarche: 15 (11/17/2023 10:03 AM)  Period Cycle (Days): regular (11/17/2023 10:03 AM)  Period Duration (Days): 7 days (11/17/2023 10:03 AM)  Period Flow: heavy (11/17/2023 10:03 AM)  Hx Prior Abnormal Pap: Yes (11/17/2023 10:03 AM)  Pap Date: 11/08/22 (11/17/2023 10:03 AM)  Pap Result Notes: WNL (11/17/2023 10:03 AM)      Last Mammo:  2018  Last Pap smear: 2021     Abnormal Pap: hx of untreated LILY 2 2019    Pelvic Infections/STD: None  Contraception: None    Review of Systems:  Pertinent items are noted in the HPI. Patient History:  New Medical Illness: None  New Surgeries: None  New Family History: None   reports that she has quit smoking. Her smoking use included cigarettes. She has never used smokeless tobacco.   reports current alcohol use.     Most Recent Immunizations   Administered Date(s) Administered    Influenza 10/19/2013    TDAP 10/10/2018   Deferred Date(s) Deferred    Influenza 02/27/2015       Objective:  /72   Pulse 86   Wt 159 lb 3.2 oz (72.2 kg)   LMP 11/01/2023 (Exact Date)   Physical Examination:  General appearance: Well dressed, well nourished in no apparent distress  Neurologic/Psychiatric: Alert and oriented to person, place and time, mood normal, affect appropriate  Head: Normocephalic without obvious deformity, atraumatic  Neck: No thyromegaly, supple, non-tender, no masses, no adenopathy  Lungs: Clear to auscultation bilaterally, no rales, wheezes or rhonchi  Breasts: Bilateral implants, symmetric, non-tender, no masses, lesions, retraction, dimpling or discharge bilaterally, no axillary or supraclavicular lymphadenopathy  Heart[de-identified] Regular rate and rhythm, no gallops or murmurs  Abdomen: Soft, non-tender, non-distended, no masses, no hepatosplenomegaly, no hernias, no inguinal lymphadenopathy  Pelvic:    External genitalia- Normal, Bartholin's, urethra, skeins glands normal   Vagina- No vaginal lesions, no discharge   Cervix- No lesions, long/closed, no cervical motion tenderness   Uterus- Normal sized, anteverted, non-tender, no masses   Adnexa-  Non-tender, no masses  Extremities: Non-tender, full range of motion, no clubbing, cyanosis or edema  Skin:  General inspection- no rashes, lesions or discoloration  Rectum: No hemorrhoids, no masses. Assessment/Plan:  Normal well-woman exam.  Yearly mammogram encouraged. Patient still checking with PCP office regarding pre auth for breast MRI. Explained that even with breast MRI being done, it would still be important to get a yearly mammogram  Hx of untreated LILY 2 2019- Pap/HPV obtained. Heavy menses- recommend check pelvic ultrasound. Desires trial of Lysteda. Patient offered chaperone for exam, declined    Diagnoses and all orders for this visit:    Encounter for gynecological examination without abnormal finding    Breast cancer screening by mammogram  -     [facility] SANTIAGO 2D+3D SCREENING BILAT (CPT=77067/30695); Future    Screening for cervical cancer  -     ThinPrep PAP Smear; Future  -     Hpv Dna  High Risk , Thin Prep Collect; Future    Menorrhagia with regular cycle  -     tranexamic acid (LYSTEDA) 650 MG Oral Tab; Take 2 tablets (1,300 mg total) by mouth every 8 (eight) hours. For up to 5 days every month with menses      Return in about 1 year (around 11/17/2024) for Annual Well Woman Exam, Ultrasound.

## 2023-11-20 LAB — HPV I/H RISK 1 DNA SPEC QL NAA+PROBE: NEGATIVE

## 2023-11-25 LAB
.: NORMAL
.: NORMAL

## 2023-12-18 ENCOUNTER — PATIENT MESSAGE (OUTPATIENT)
Dept: OBGYN CLINIC | Facility: CLINIC | Age: 43
End: 2023-12-18

## 2024-01-25 RX ORDER — METRONIDAZOLE 500 MG/1
500 TABLET ORAL 2 TIMES DAILY
Qty: 14 TABLET | Refills: 0 | OUTPATIENT
Start: 2024-01-25 | End: 2024-02-01

## 2024-01-25 NOTE — TELEPHONE ENCOUNTER
Refill not appropriate for metronidazole.  Patient notified by [facility] that she will need to call to discuss her symptoms.

## 2024-01-29 ENCOUNTER — TELEPHONE (OUTPATIENT)
Dept: OBGYN CLINIC | Facility: CLINIC | Age: 44
End: 2024-01-29

## 2024-01-29 RX ORDER — METRONIDAZOLE 500 MG/1
500 TABLET ORAL 2 TIMES DAILY
Qty: 14 TABLET | Refills: 0 | Status: SHIPPED | OUTPATIENT
Start: 2024-01-29

## 2024-01-29 NOTE — TELEPHONE ENCOUNTER
Called and spoke with pt. And told her rx was sent in for her,  but to also contact office if s/s do not go away with medication or they worsen..  Pt. Verbalizes understanding

## 2024-01-29 NOTE — TELEPHONE ENCOUNTER
Pt called, states she thinks she has a yeast infection. No itching no burning just foul odor. Pt does state she also has white thick discharge. Pt did conf pharm on file. Please call back and advise.

## 2024-01-29 NOTE — TELEPHONE ENCOUNTER
Called and spoke with pt. She developed these sym[toms last Tuesday. After her period....white, thick at times and at other times watery discharge with \"fishy\" odor...no other symptoms voices.    Pt. was last treated for this 12/22 with Flagyl...Did not know if you wanted to see pt. And swab her or treat her..  She was seen 11/2023 for her WWE with ..  I have pended rx  Please advise..  Thanks

## 2024-01-29 NOTE — TELEPHONE ENCOUNTER
OK for Rx x 1 but if her symptoms reoccur or persist she needs to be seen in the office for evaluation.

## 2024-04-05 ENCOUNTER — TELEPHONE (OUTPATIENT)
Dept: OBGYN CLINIC | Facility: CLINIC | Age: 44
End: 2024-04-05

## 2024-04-30 ENCOUNTER — HOSPITAL ENCOUNTER (OUTPATIENT)
Age: 44
Discharge: HOME | End: 2024-04-30
Attending: EMERGENCY MEDICINE
Payer: COMMERCIAL

## 2024-04-30 VITALS
DIASTOLIC BLOOD PRESSURE: 80 MMHG | HEIGHT: 67 IN | SYSTOLIC BLOOD PRESSURE: 117 MMHG | OXYGEN SATURATION: 99 % | BODY MASS INDEX: 24.64 KG/M2 | RESPIRATION RATE: 14 BRPM | TEMPERATURE: 98 F | HEART RATE: 66 BPM | WEIGHT: 157 LBS

## 2024-04-30 DIAGNOSIS — R07.89 CHEST PAIN, ATYPICAL: Primary | ICD-10-CM

## 2024-04-30 LAB
ATRIAL RATE: 86 BPM
BUN BLD-MCNC: 7 MG/DL (ref 7–18)
CHLORIDE BLD-SCNC: 104 MMOL/L (ref 98–112)
CO2 BLD-SCNC: 24 MMOL/L (ref 21–32)
CREAT BLD-MCNC: 0.7 MG/DL
DDIMER WHOLE BLOOD: <200 NG/ML DDU (ref ?–400)
EGFRCR SERPLBLD CKD-EPI 2021: 111 ML/MIN/1.73M2 (ref 60–?)
GLUCOSE BLD-MCNC: 120 MG/DL (ref 70–99)
HCT VFR BLD CALC: 40 %
ISTAT IONIZED CALCIUM FOR CHEM 8: 1.21 MMOL/L (ref 1.12–1.32)
P AXIS: 61 DEGREES
P-R INTERVAL: 158 MS
POTASSIUM BLD-SCNC: 3.8 MMOL/L (ref 3.6–5.1)
Q-T INTERVAL: 356 MS
QRS DURATION: 76 MS
QTC CALCULATION (BEZET): 426 MS
R AXIS: 31 DEGREES
SODIUM BLD-SCNC: 140 MMOL/L (ref 136–145)
T AXIS: 61 DEGREES
TROPONIN I BLD-MCNC: <0.02 NG/ML
VENTRICULAR RATE: 86 BPM

## 2024-04-30 PROCEDURE — 93010 ELECTROCARDIOGRAM REPORT: CPT

## 2024-04-30 PROCEDURE — 85378 FIBRIN DEGRADE SEMIQUANT: CPT | Performed by: EMERGENCY MEDICINE

## 2024-04-30 PROCEDURE — 84484 ASSAY OF TROPONIN QUANT: CPT

## 2024-04-30 PROCEDURE — 99214 OFFICE O/P EST MOD 30 MIN: CPT

## 2024-04-30 PROCEDURE — 93005 ELECTROCARDIOGRAM TRACING: CPT

## 2024-04-30 PROCEDURE — 80047 BASIC METABLC PNL IONIZED CA: CPT

## 2024-04-30 PROCEDURE — 96374 THER/PROPH/DIAG INJ IV PUSH: CPT

## 2024-04-30 RX ORDER — KETOROLAC TROMETHAMINE 30 MG/ML
30 INJECTION, SOLUTION INTRAMUSCULAR; INTRAVENOUS ONCE
Status: COMPLETED | OUTPATIENT
Start: 2024-04-30 | End: 2024-04-30

## 2024-04-30 NOTE — ED PROVIDER NOTES
Patient Seen in: [facility]      History     Chief Complaint   Patient presents with    Chest Pain Angina            Stated Complaint: Chest Pain Angina, Shortness of Breath, Fingers Tingling    Subjective:   HPI    43-year-old female presents to the immediate care for complaints of chest pain.  She complains of a sharp midsternal pain.  Is worse with deep breath.  Denies any nausea vomiting diaphoresis.  Denies any radiation of the pain.  She denies any history of hypertension or diabetes.  Patient states that she quit smoking several years ago.  She has a family history of heart disease with her father.  Denies any exertional chest pain.  Patient denies any other exacerbating alleviating factors.  Denies any recent stressors.  She has no history of any birth control use.  Denies any travel history.  Denies any calf pain or pedal edema.  No prior history of DVT or PE    Objective:   Past Medical History:    Back pain    Bloating    Chronic rhinitis    uses zyrtec    Constipation    Dysmenorrhea    Frequent use of laxatives    Heavy menses    Hemorrhoids    Menses painful    Pap smear for cervical cancer screening    wnl    PONV (postoperative nausea and vomiting)    Visual impairment    glasses    Wears glasses    Weight gain              Past Surgical History:   Procedure Laterality Date    Abdominoplasty  2022    Appendectomy  10/2013    Appendectomy  2013    Colonoscopy  Maybe 5 years ago    Neil localization wire 1 site left (cpt=19281)  2015    Mast prtl Left 09/10/2015    Procedure: BREAST LUMPECTOMY;  Surgeon: Joss Modi MD;  [facility]      two, denies complix.    Other surgical history      SPONT. AB    Tubal ligation                  Social History     Socioeconomic History    Marital status:    Tobacco Use    Smoking status: Former     Current packs/day: 0.00     Types: Cigarettes    Smokeless tobacco: Never   Vaping Use    Vaping status:  Never Used   Substance and Sexual Activity    Alcohol use: Yes     Comment: occ    Drug use: No    Sexual activity: Yes     Partners: Male     Birth control/protection: Tubal Ligation   Other Topics Concern    Caffeine Concern Yes     Comment: 1 cup of coffee, 1 cup of green tea x day    Exercise No    Seat Belt Yes              Review of Systems    Positive for stated complaint: Chest Pain Angina, Shortness of Breath, Fingers Tingling  Other systems are as noted in HPI.  Constitutional and vital signs reviewed.      All other systems reviewed and negative except as noted above.    Physical Exam     ED Triage Vitals [04/30/24 1030]   BP (!) 155/101   Pulse 91   Resp 16   Temp 97.9 °F (36.6 °C)   Temp src Temporal   SpO2 100 %   O2 Device None (Room air)       Current:BP (!) 155/101   Pulse 91   Temp 97.9 °F (36.6 °C) (Temporal)   Resp 16   Ht 170.2 cm (5' 7\")   Wt 71.2 kg   LMP 04/26/2024 (Exact Date)   SpO2 100%   BMI 24.59 kg/m²         Physical Exam    General: Alert and oriented. No acute distress.  HEENT: Normocephalic. No evidence of trauma. Extraocular movements are intact.  Cardiovascular exam: Regular rate and rhythm  Lungs: Clear to auscultation bilaterally.  Abdomen: Soft, nondistended, nontender.  Extremities: No evidence of deformity. No clubbing or cyanosis.  Neuro: No focal deficit is noted.    ED Course     Labs Reviewed   POCT ISTAT CHEM8 CARTRIDGE - Abnormal; Notable for the following components:       Result Value    ISTAT Glucose 120 (*)     All other components within normal limits   D-DIMER (POC) - Normal   ISTAT TROPONIN - Normal     EKG    Rate, intervals and axes as noted on EKG Report.  Rate: 86  Rhythm: Sinus Rhythm  Reading: Normal sinus rhythm.  Ventricular rate 86.  No acute ST elevations or depressions.  Rate, axis, normals are noted.  Agree with computer interpretation.  Comparison with prior EKG from November 18, 2022 shows no acute change         History is provided by the  patient  Past medical history includes dysmenorrhea  Social history: Patient was a previous ex-smoker.  Denies illicit drug abuse  Family history significant for coronary disease with her father and grandfather  Surgical history includes tubal ligation and appendectomy and abdominoplasty  Differential diagnosis includes atypical chest pain versus gastroesophageal reflux versus musculoskeletal pain versus pleurisy versus PE  Twelve-lead EKG shows no acute ischemic changes.  Patient has low risk factors for PE.  Her well score is 0.  Her heart score is a 1 for family history.  Patient is deemed to be low risk for major adverse cardiac event with a low risk categorization of 0.9 to 1.7%.    Patient's medical chart was reviewed.  She has had previous visits to the emergency department for complaints of chest pain most notably November 18, 2022 and April 17, 2022.  The descriptive sound similar to today's episode.  Her EKG shows no acute ischemic changes or change in morphology from her prior EKG.    Patient also states that she underwent a stress test which was negative on April 30, 2019.  Report of this was reviewed:  EXERCISE FINDINGS:  The patient exercised for 7 minutes and 30 seconds on a Martell protocol achieving 6.8 METs, a maximal blood pressure of 146/62 mmHg and a maximum heart rate of 173 beats per minute. Exercise was discontinued due to fatigue.      The electrocardiographic response to exercise was normal.  The patient denied cardiac symptoms.  The patient's overall exercise tolerance was below average achieving 94% of the maximal predicted heart rate.     IMPRESSION:  Normal exercise ECG.           reggie Bansal MD 04/30/2019  t  [id number] 5/1/2019 3:46 PM    Patient had a peripheral IV established.  Labs are drawn including chemistry panel troponin and D-dimer.  She was given Toradol 30 mg IV  Troponin and D dimer are negative. Patient improved with Toradol.         MDM   Patient was screened and  evaluated during this visit.   As a treating physician attending to the patient, I determined, within reasonable clinical confidence and prior to discharge, that an emergency medical condition was not or was no longer present.  There was no indication for further evaluation, treatment or admission on an emergency basis.  Comprehensive verbal and written discharge and follow-up instructions were provided to help prevent relapse or worsening.  Patient was instructed to follow-up with her primary care provider for further evaluation and treatment, but to return immediately to the ER for worsening, concerning, new, changing or persisting symptoms.  I discussed the case with the patient and they had no questions, complaints, or concerns.  Patient felt comfortable going home.    ^^Please note that this report has been produced using speech recognition software and may contain errors related to that system including, but not limited to, errors in grammar, punctuation, and spelling, as well as words and phrases that possibly may have been recognized inappropriately.  If there are any questions or concerns, contact the dictating provider for clarification                                 Medical Decision Making      Disposition and Plan     Clinical Impression:  1. Chest pain, atypical         Disposition:  Discharge  4/30/2024 11:33 am    Follow-up:  Sindy Estes DO  [address] [place] [id number] [phone]    Call   As needed, If symptoms worsen          Medications Prescribed:  Current Discharge Medication List

## 2024-04-30 NOTE — DISCHARGE INSTRUCTIONS
Follow-up with your primary care provider  Take ibuprofen as needed for pain  Return if any worsening symptoms or new concerns

## 2024-04-30 NOTE — ED INITIAL ASSESSMENT (HPI)
Pt states the chest pain started today at 0850, it comes and goes, nothing make pain worst or better . Feels like her heart is racing and heart feels pressure. Denies pain radiating anywhere else besides mid sternum.

## 2024-10-08 NOTE — PROGRESS NOTES
No C/O, had spotting/bleeding for several days  Pathology reviewed  LILY II  Had neg HPV/neg PAP 2017    ROS: No Cardiac, Respiratory, GI,  or Neurological symptoms.     PE:  Abdomen soft  Cx: no lesion, healed well  Uterus normal    A/P: Discussed treatme
independent

## (undated) NOTE — LETTER
Date & Time: 7/9/2022, 2:45 PM  Patient: Joi Levin  Encounter Provider(s): Asael Clarke MD       To Whom It May Concern:    Joi Levin was seen and treated in our department on 7/9/2022. She should not return to work until 7/15/2022.     If you have any questions or concerns, please do not hesitate to call.        _____________________________  Physician/APC Signature

## (undated) NOTE — LETTER
Date & Time: 7/5/2018, 9:09 AM  Patient: Maddi Montoya  Encounter Provider(s):    Angel Rodriguez MD       To Whom It May Concern:    Maddi Montoya was seen and treated in our department on 7/5/2018.  She should not return to work until 7/6/18 and can retu

## (undated) NOTE — LETTER
Date & Time: 9/19/2019, 1:56 PM  Patient: Noah Rivera  Encounter Provider(s):    Patric Nguyen MD       To Whom It May Concern:    Noah Rivera was seen and treated in our department on 9/19/2019.  She can return to work with these limitations: No twi

## (undated) NOTE — MR AVS SNAPSHOT
After Visit Summary   8/31/2021    Ashely Yeboah    MRN: [id number]           Visit Information     Date & Time  8/31/2021 10:00 AM Provider  Kajal Dave[facility] [address] [address] [place] [address]  Phone  [phone] will help us do so. For more information on [employer], please visit [email or web address]                   DO YOU KNOW WHERE TO GO? Injury & Illness are never convenient.  If you are dealing with a   non-emergency, consider your o available at [facility],   visit [email or web address] or call [phone] ([phone])

## (undated) NOTE — MR AVS SNAPSHOT
After Visit Summary   8/21/2020    Sixto Khan    MRN: [id number]           Visit Information     Date & Time  8/21/2020  1:00 PM Provider  Ely Bahena MD [facility] [address], [address] [address]  Phone  [phone] Choose whole grain products Foods high in sodium   Water is best for hydration Fast food.    Eat at home when possible     Tips for increasing your physical activity – Adults who are physically active are less likely to develop some chronic diseases than ad Treatment for mild illness or injury that does not require immediate attention VIDEO VISITS  Average cost  $35*    e-VISTS  Average cost  $35*     SAME DAY APPOINTMENTS   Available at primary care offices    [address] [place] [address]  OFFICE VISIT   [facility]

## (undated) NOTE — MR AVS SNAPSHOT
[address]  [address] [address] [phone]               Thank you for choosing us for your health care visit with Sandro Stein DO.   We are glad to serve you and happy to provide you with this massey Order:  Babatunde Ahuja Internal    [facility], [address] [address]   Phone:  [phone]   Fax:  [address], [address] [place] [address]   Phone:  [phone]   Fax: numbers can create reimbursement difficulties for you.     Assoc Dx:  Chronic constipation [K59.09]          [employer]     Call the [employer] for assistance with your inactive [employer] account    If you have questions, you can call [phone] to talk to our [email or web address]

## (undated) NOTE — LETTER
Linnea Mejia, :1980    CONSENT FOR PROCEDURE/SEDATION    1. I authorize the performance upon Mary Kel  the following: {[facility] OFFICE PROCEDURES:2148}    2.  I authorize Dr. Otto Parker MD (and whomever is designated as the doctor’s ass Relationship to patient: ____________________________________________    Witness: _________________________________________ Date:___________     Physician Signature: _______________________________ Date:___________

## (undated) NOTE — LETTER
Jihan Geronimo, :1980    CONSENT FOR PROCEDURE/SEDATION    1. I authorize the performance upon Jihan Geronimo  the following: Colposcopy with biopsy and Endocervical curettage    2.  I authorize Dr. Pedro German MD (and whomever is designated as t Relationship to patient: ____________________________________________    Witness: _________________________________________ Date:___________     Physician Signature: _______________________________ Date:___________

## (undated) NOTE — ED AVS SNAPSHOT
Haylie Elder   MRN: [id number]    Department:  [facility] Emergency Department   Date of Visit:  8/12/2017           Disclosure     Insurance plans vary and the physician(s) referred by the ER may not be covered by your plan.  Please contact your i If you have been prescribed any medication(s), please fill your prescription right away and begin taking the medication(s) as directed    If the emergency physician has read X-rays, these will be re-interpreted by a radiologist.  If there is a significant

## (undated) NOTE — LETTER
Lyn Craft, :1980    CONSENT FOR PROCEDURE/SEDATION    1. I authorize the performance upon Lyn Craft  the following: Sonohysterogram    2.  I authorize Dr. Bert Matos MD (and whomever is designated as the doctor’s assistant), to perform Witness: _________________________________________ Date:___________     Physician Signature: _______________________________ Date:___________

## (undated) NOTE — MR AVS SNAPSHOT
[address]  [address] [address] [phone]               Thank you for choosing us for your health care visit with Tesha Rogers DO.   We are glad to serve you and happy to provide you with this massey medications prescribed for you. Read the directions carefully, and ask your doctor or other care provider to review them with you.          Where to Get Your Medications      These medications were sent to [address] [place] [address]

## (undated) NOTE — LETTER
[facility] [place]  [address] [address]  Dept: [phone]  Dept Fax: [phone]      July 27, 2017    Patient: Ashely Yeboah   Date of Visit: 7/27/2017       To Whom It May Concern:    Ashely Yeboah was seen and treated

## (undated) NOTE — LETTER
10/10/19        [address] [address]      Dear Vern Rain records indicate that you have outstanding lab work and or testing that was ordered for you and has not yet been completed:  Orders Placed This Encounter      Co

## (undated) NOTE — MR AVS SNAPSHOT
[facility] [address]  [address]  [phone]               Thank you for choosing us for your health care visit with Damaris Cain MD.  We are glad to serve you and happy to provide you with t Instructions: To schedule an appointment for your radiology test please call Lety Avery Scheduling at [phone].          Referral Information     Referral Order Referred to [address] Phone Visits Status Diagnosis           Fertility fabio

## (undated) NOTE — LETTER
Luciana Escobar, :1980    CONSENT FOR PROCEDURE/SEDATION    1. I authorize the performance upon Luciana Escobar  the following: Colposcopy with biopsy and Endocervical curettage    2.  I authorize Dr. Yajaira Rangel MD (and whomever is designated as the doc Relationship to patient: ____________________________________________    Witness: _________________________________________ Date:___________     Physician Signature: _______________________________ Date:___________